# Patient Record
Sex: MALE | Race: WHITE | Employment: OTHER | URBAN - METROPOLITAN AREA
[De-identification: names, ages, dates, MRNs, and addresses within clinical notes are randomized per-mention and may not be internally consistent; named-entity substitution may affect disease eponyms.]

---

## 2017-12-07 ENCOUNTER — LAB ENCOUNTER (OUTPATIENT)
Dept: LAB | Age: 82
End: 2017-12-07
Attending: INTERNAL MEDICINE
Payer: MEDICARE

## 2017-12-07 DIAGNOSIS — R53.81 DEBILITY: ICD-10-CM

## 2017-12-07 DIAGNOSIS — R53.81 OTHER MALAISE: ICD-10-CM

## 2017-12-07 DIAGNOSIS — E55.9 VITAMIN D DEFICIENCY: Primary | ICD-10-CM

## 2017-12-07 DIAGNOSIS — I10 ESSENTIAL HYPERTENSION: ICD-10-CM

## 2017-12-07 DIAGNOSIS — E78.00 PURE HYPERCHOLESTEROLEMIA: ICD-10-CM

## 2017-12-07 DIAGNOSIS — E78.1 PURE HYPERGLYCERIDEMIA: ICD-10-CM

## 2017-12-07 DIAGNOSIS — I97.3: ICD-10-CM

## 2017-12-07 PROCEDURE — 82728 ASSAY OF FERRITIN: CPT

## 2017-12-07 PROCEDURE — 85025 COMPLETE CBC W/AUTO DIFF WBC: CPT

## 2017-12-07 PROCEDURE — 81003 URINALYSIS AUTO W/O SCOPE: CPT

## 2017-12-07 PROCEDURE — 83735 ASSAY OF MAGNESIUM: CPT

## 2017-12-07 PROCEDURE — 84436 ASSAY OF TOTAL THYROXINE: CPT

## 2017-12-07 PROCEDURE — 80053 COMPREHEN METABOLIC PANEL: CPT

## 2017-12-07 PROCEDURE — 36415 COLL VENOUS BLD VENIPUNCTURE: CPT

## 2017-12-07 PROCEDURE — 82306 VITAMIN D 25 HYDROXY: CPT

## 2017-12-07 PROCEDURE — 84480 ASSAY TRIIODOTHYRONINE (T3): CPT

## 2017-12-07 PROCEDURE — 80061 LIPID PANEL: CPT

## 2017-12-07 PROCEDURE — 82746 ASSAY OF FOLIC ACID SERUM: CPT

## 2017-12-07 PROCEDURE — 83540 ASSAY OF IRON: CPT

## 2017-12-07 PROCEDURE — 85652 RBC SED RATE AUTOMATED: CPT

## 2017-12-07 PROCEDURE — 83036 HEMOGLOBIN GLYCOSYLATED A1C: CPT

## 2017-12-07 PROCEDURE — 82607 VITAMIN B-12: CPT

## 2017-12-07 PROCEDURE — 84100 ASSAY OF PHOSPHORUS: CPT

## 2017-12-07 PROCEDURE — 84443 ASSAY THYROID STIM HORMONE: CPT

## 2018-11-14 ENCOUNTER — LAB ENCOUNTER (OUTPATIENT)
Dept: LAB | Facility: HOSPITAL | Age: 83
End: 2018-11-14
Attending: INTERNAL MEDICINE
Payer: MEDICARE

## 2018-11-14 DIAGNOSIS — R53.81 MALAISE: Primary | ICD-10-CM

## 2018-11-14 DIAGNOSIS — R53.82 CHRONIC FATIGUE SYNDROME: ICD-10-CM

## 2018-11-14 DIAGNOSIS — D64.9 ANEMIA: ICD-10-CM

## 2018-11-14 DIAGNOSIS — E78.01 FAMILIAL HYPERCHOLESTEROLEMIA: ICD-10-CM

## 2018-11-14 DIAGNOSIS — N40.0 ENLARGED PROSTATE: ICD-10-CM

## 2018-11-14 DIAGNOSIS — E78.00 HYPERCHOLESTEROLEMIA: ICD-10-CM

## 2018-11-14 DIAGNOSIS — R53.82 CHRONIC FATIGUE: ICD-10-CM

## 2018-11-14 DIAGNOSIS — I10 ESSENTIAL HYPERTENSION, MALIGNANT: ICD-10-CM

## 2018-11-14 DIAGNOSIS — R53.83 FATIGUE: ICD-10-CM

## 2018-11-14 DIAGNOSIS — I97.3: ICD-10-CM

## 2018-11-14 DIAGNOSIS — E55.9 VITAMIN D DEFICIENCY: ICD-10-CM

## 2018-11-14 PROCEDURE — 82746 ASSAY OF FOLIC ACID SERUM: CPT

## 2018-11-14 PROCEDURE — 80061 LIPID PANEL: CPT

## 2018-11-14 PROCEDURE — 84439 ASSAY OF FREE THYROXINE: CPT

## 2018-11-14 PROCEDURE — 84550 ASSAY OF BLOOD/URIC ACID: CPT

## 2018-11-14 PROCEDURE — 84481 FREE ASSAY (FT-3): CPT

## 2018-11-14 PROCEDURE — 82728 ASSAY OF FERRITIN: CPT

## 2018-11-14 PROCEDURE — 82607 VITAMIN B-12: CPT

## 2018-11-14 PROCEDURE — 85025 COMPLETE CBC W/AUTO DIFF WBC: CPT

## 2018-11-14 PROCEDURE — 83540 ASSAY OF IRON: CPT

## 2018-11-14 PROCEDURE — 82306 VITAMIN D 25 HYDROXY: CPT

## 2018-11-14 PROCEDURE — 83735 ASSAY OF MAGNESIUM: CPT

## 2018-11-14 PROCEDURE — 84443 ASSAY THYROID STIM HORMONE: CPT

## 2018-11-14 PROCEDURE — 80053 COMPREHEN METABOLIC PANEL: CPT

## 2018-11-14 PROCEDURE — 36415 COLL VENOUS BLD VENIPUNCTURE: CPT

## 2018-11-14 PROCEDURE — 81003 URINALYSIS AUTO W/O SCOPE: CPT

## 2019-01-01 ENCOUNTER — EXTERNAL RECORD (OUTPATIENT)
Dept: HEALTH INFORMATION MANAGEMENT | Facility: OTHER | Age: 84
End: 2019-01-01

## 2019-01-11 ENCOUNTER — APPOINTMENT (OUTPATIENT)
Dept: LAB | Age: 84
End: 2019-01-11
Attending: DERMATOLOGY
Payer: MEDICARE

## 2019-01-11 DIAGNOSIS — B35.3 TINEA PEDIS OF BOTH FEET: ICD-10-CM

## 2019-01-11 DIAGNOSIS — B35.1 TINEA UNGUIUM: ICD-10-CM

## 2019-01-11 LAB
ALBUMIN SERPL-MCNC: 3.8 G/DL (ref 3.1–4.5)
ALBUMIN/GLOB SERPL: 1.2 {RATIO} (ref 1–2)
ALP LIVER SERPL-CCNC: 105 U/L (ref 45–117)
ALT SERPL-CCNC: 21 U/L (ref 17–63)
ANION GAP SERPL CALC-SCNC: 6 MMOL/L (ref 0–18)
AST SERPL-CCNC: 16 U/L (ref 15–41)
BILIRUB SERPL-MCNC: 0.8 MG/DL (ref 0.1–2)
BUN BLD-MCNC: 15 MG/DL (ref 8–20)
BUN/CREAT SERPL: 13.6 (ref 10–20)
CALCIUM BLD-MCNC: 8.6 MG/DL (ref 8.3–10.3)
CHLORIDE SERPL-SCNC: 109 MMOL/L (ref 101–111)
CO2 SERPL-SCNC: 26 MMOL/L (ref 22–32)
CREAT BLD-MCNC: 1.1 MG/DL (ref 0.7–1.3)
GLOBULIN PLAS-MCNC: 3.1 G/DL (ref 2.8–4.4)
GLUCOSE BLD-MCNC: 110 MG/DL (ref 70–99)
M PROTEIN MFR SERPL ELPH: 6.9 G/DL (ref 6.4–8.2)
OSMOLALITY SERPL CALC.SUM OF ELEC: 293 MOSM/KG (ref 275–295)
POTASSIUM SERPL-SCNC: 4.1 MMOL/L (ref 3.6–5.1)
SODIUM SERPL-SCNC: 141 MMOL/L (ref 136–144)

## 2019-01-11 PROCEDURE — 80053 COMPREHEN METABOLIC PANEL: CPT

## 2019-01-11 PROCEDURE — 85027 COMPLETE CBC AUTOMATED: CPT

## 2019-01-12 LAB
ERYTHROCYTE [DISTWIDTH] IN BLOOD BY AUTOMATED COUNT: 13.5 % (ref 11.5–16)
HCT VFR BLD AUTO: 39.5 % (ref 37–53)
HGB BLD-MCNC: 12.6 G/DL (ref 13–17)
MCH RBC QN AUTO: 32 PG (ref 27–33.2)
MCHC RBC AUTO-ENTMCNC: 31.9 G/DL (ref 31–37)
MCV RBC AUTO: 100.3 FL (ref 80–99)
PLATELET # BLD AUTO: 270 10(3)UL (ref 150–450)
RBC # BLD AUTO: 3.94 X10(6)UL (ref 3.8–5.8)
RED CELL DISTRIBUTION WIDTH-SD: 50.4 FL (ref 35.1–46.3)
WBC # BLD AUTO: 7.3 X10(3) UL (ref 4–13)

## 2019-03-01 ENCOUNTER — APPOINTMENT (OUTPATIENT)
Dept: LAB | Age: 84
End: 2019-03-01
Attending: DERMATOLOGY
Payer: MEDICARE

## 2019-03-01 DIAGNOSIS — B35.1 TINEA UNGUIUM: ICD-10-CM

## 2019-03-01 DIAGNOSIS — B35.3 TINEA PEDIS OF BOTH FEET: ICD-10-CM

## 2019-03-01 LAB
ALBUMIN SERPL-MCNC: 3.9 G/DL (ref 3.4–5)
ALBUMIN/GLOB SERPL: 1.3 {RATIO} (ref 1–2)
ALP LIVER SERPL-CCNC: 102 U/L (ref 45–117)
ALT SERPL-CCNC: 22 U/L (ref 16–61)
ANION GAP SERPL CALC-SCNC: 7 MMOL/L (ref 0–18)
AST SERPL-CCNC: 12 U/L (ref 15–37)
BILIRUB SERPL-MCNC: 0.6 MG/DL (ref 0.1–2)
BUN BLD-MCNC: 13 MG/DL (ref 7–18)
BUN/CREAT SERPL: 13.1 (ref 10–20)
CALCIUM BLD-MCNC: 8.9 MG/DL (ref 8.5–10.1)
CHLORIDE SERPL-SCNC: 109 MMOL/L (ref 98–107)
CO2 SERPL-SCNC: 25 MMOL/L (ref 21–32)
CREAT BLD-MCNC: 0.99 MG/DL (ref 0.7–1.3)
GLOBULIN PLAS-MCNC: 3 G/DL (ref 2.8–4.4)
GLUCOSE BLD-MCNC: 107 MG/DL (ref 70–99)
M PROTEIN MFR SERPL ELPH: 6.9 G/DL (ref 6.4–8.2)
OSMOLALITY SERPL CALC.SUM OF ELEC: 293 MOSM/KG (ref 275–295)
POTASSIUM SERPL-SCNC: 4.3 MMOL/L (ref 3.5–5.1)
SODIUM SERPL-SCNC: 141 MMOL/L (ref 136–145)

## 2019-03-01 PROCEDURE — 80053 COMPREHEN METABOLIC PANEL: CPT

## 2019-03-01 PROCEDURE — 85027 COMPLETE CBC AUTOMATED: CPT

## 2019-03-02 LAB
DEPRECATED RDW RBC AUTO: 53.1 FL (ref 35.1–46.3)
ERYTHROCYTE [DISTWIDTH] IN BLOOD BY AUTOMATED COUNT: 14.7 % (ref 11–15)
HCT VFR BLD AUTO: 41.4 % (ref 39–53)
HGB BLD-MCNC: 13.3 G/DL (ref 13–17.5)
MCH RBC QN AUTO: 31.5 PG (ref 26–34)
MCHC RBC AUTO-ENTMCNC: 32.1 G/DL (ref 31–37)
MCV RBC AUTO: 98.1 FL (ref 80–100)
PLATELET # BLD AUTO: 256 10(3)UL (ref 150–450)
RBC # BLD AUTO: 4.22 X10(6)UL (ref 3.8–5.8)
WBC # BLD AUTO: 6 X10(3) UL (ref 4–11)

## 2019-10-09 ENCOUNTER — HOSPITAL ENCOUNTER (OUTPATIENT)
Dept: MRI IMAGING | Age: 84
Discharge: HOME | End: 2019-10-09
Attending: OPHTHALMOLOGY
Payer: MEDICARE

## 2019-10-09 DIAGNOSIS — H53.433 ARCUATE SCOTOMA OF BOTH EYES: ICD-10-CM

## 2019-10-09 PROCEDURE — 70553 MRI BRAIN STEM W/O & W/DYE: CPT | Performed by: OPHTHALMOLOGY

## 2019-10-09 PROCEDURE — A9575 INJ GADOTERATE MEGLUMI 0.1ML: HCPCS | Performed by: OPHTHALMOLOGY

## 2019-11-08 RX ORDER — BRIMONIDINE TARTRATE 1.5 MG/ML
1 SOLUTION/ DROPS OPHTHALMIC 2 TIMES DAILY
COMMUNITY
Start: 2015-09-24

## 2019-11-08 RX ORDER — DOXAZOSIN MESYLATE 4 MG/1
4 TABLET ORAL DAILY
COMMUNITY

## 2019-11-08 RX ORDER — FUROSEMIDE 20 MG/1
20 TABLET ORAL DAILY
COMMUNITY
Start: 2019-08-08

## 2019-11-08 RX ORDER — LATANOPROST 50 UG/ML
SOLUTION/ DROPS OPHTHALMIC
COMMUNITY
Start: 2019-05-31

## 2019-11-08 RX ORDER — MULTIVITAMIN
1 CAPSULE ORAL DAILY
COMMUNITY
Start: 2014-08-20

## 2019-11-08 RX ORDER — METOPROLOL TARTRATE 50 MG/1
50 TABLET, FILM COATED ORAL 2 TIMES DAILY
COMMUNITY
Start: 2019-08-08 | End: 2019-12-10 | Stop reason: ALTCHOICE

## 2019-11-08 RX ORDER — OMEPRAZOLE 20 MG/1
20 CAPSULE, DELAYED RELEASE ORAL DAILY
COMMUNITY
Start: 2015-12-09 | End: 2019-11-04 | Stop reason: ALTCHOICE

## 2019-11-08 RX ORDER — MULTIVIT WITH MINERALS/LUTEIN
1000 TABLET ORAL DAILY
COMMUNITY
End: 2019-11-04 | Stop reason: ALTCHOICE

## 2019-11-08 RX ORDER — SIMVASTATIN 20 MG
20 TABLET ORAL DAILY
COMMUNITY
End: 2019-11-04 | Stop reason: ALTCHOICE

## 2019-11-08 RX ORDER — DORZOLAMIDE HYDROCHLORIDE AND TIMOLOL MALEATE 20; 5 MG/ML; MG/ML
1 SOLUTION/ DROPS OPHTHALMIC 2 TIMES DAILY
COMMUNITY
Start: 2019-06-12

## 2019-11-11 ENCOUNTER — OFFICE VISIT (OUTPATIENT)
Dept: CARDIOLOGY | Age: 84
End: 2019-11-11

## 2019-11-11 VITALS — BODY MASS INDEX: 30.1 KG/M2 | HEIGHT: 71 IN | WEIGHT: 215 LBS

## 2019-11-11 DIAGNOSIS — R06.02 SHORTNESS OF BREATH: ICD-10-CM

## 2019-11-11 DIAGNOSIS — I48.91 ATRIAL FIBRILLATION, UNSPECIFIED TYPE (CMD): Primary | ICD-10-CM

## 2019-11-11 DIAGNOSIS — E78.00 HYPERCHOLESTEREMIA: ICD-10-CM

## 2019-11-11 DIAGNOSIS — I48.91 ATRIAL FIBRILLATION WITH RVR (CMD): ICD-10-CM

## 2019-11-11 DIAGNOSIS — I42.0 DILATED CARDIOMYOPATHY (CMD): ICD-10-CM

## 2019-11-11 DIAGNOSIS — I51.89 COMBINED SYSTOLIC AND DIASTOLIC CARDIAC DYSFUNCTION: ICD-10-CM

## 2019-11-11 PROCEDURE — 93000 ELECTROCARDIOGRAM COMPLETE: CPT | Performed by: INTERNAL MEDICINE

## 2019-11-11 PROCEDURE — 99205 OFFICE O/P NEW HI 60 MIN: CPT | Performed by: INTERNAL MEDICINE

## 2019-11-11 RX ORDER — FAMOTIDINE 20 MG/1
20 TABLET, FILM COATED ORAL PRN
COMMUNITY

## 2019-11-11 SDOH — HEALTH STABILITY: PHYSICAL HEALTH: ON AVERAGE, HOW MANY DAYS PER WEEK DO YOU ENGAGE IN MODERATE TO STRENUOUS EXERCISE (LIKE A BRISK WALK)?: 2 DAYS

## 2019-11-11 SDOH — SOCIAL STABILITY: SOCIAL NETWORK: ARE YOU MARRIED, WIDOWED, DIVORCED, SEPARATED, NEVER MARRIED, OR LIVING WITH A PARTNER?: MARRIED

## 2019-11-11 SDOH — HEALTH STABILITY: PHYSICAL HEALTH: ON AVERAGE, HOW MANY MINUTES DO YOU ENGAGE IN EXERCISE AT THIS LEVEL?: 90 MIN

## 2019-11-11 ASSESSMENT — PATIENT HEALTH QUESTIONNAIRE - PHQ9
SUM OF ALL RESPONSES TO PHQ9 QUESTIONS 1 AND 2: 0
2. FEELING DOWN, DEPRESSED OR HOPELESS: NOT AT ALL
1. LITTLE INTEREST OR PLEASURE IN DOING THINGS: NOT AT ALL
SUM OF ALL RESPONSES TO PHQ9 QUESTIONS 1 AND 2: 0

## 2019-11-13 ENCOUNTER — TELEPHONE (OUTPATIENT)
Dept: CARDIOLOGY | Age: 84
End: 2019-11-13

## 2019-11-19 ENCOUNTER — LAB ENCOUNTER (OUTPATIENT)
Dept: LAB | Age: 84
End: 2019-11-19
Attending: INTERNAL MEDICINE
Payer: MEDICARE

## 2019-11-19 ENCOUNTER — OFFICE VISIT (OUTPATIENT)
Dept: CARDIOLOGY | Age: 84
End: 2019-11-19

## 2019-11-19 VITALS
SYSTOLIC BLOOD PRESSURE: 112 MMHG | DIASTOLIC BLOOD PRESSURE: 64 MMHG | HEART RATE: 116 BPM | HEIGHT: 71 IN | BODY MASS INDEX: 30.52 KG/M2 | WEIGHT: 218 LBS

## 2019-11-19 DIAGNOSIS — E78.00 PURE HYPERCHOLESTEROLEMIA: ICD-10-CM

## 2019-11-19 DIAGNOSIS — R06.02 SHORTNESS OF BREATH: ICD-10-CM

## 2019-11-19 DIAGNOSIS — I48.91 ATRIAL FIBRILLATION (HCC): Primary | ICD-10-CM

## 2019-11-19 DIAGNOSIS — I48.19 PERSISTENT ATRIAL FIBRILLATION (CMD): Primary | ICD-10-CM

## 2019-11-19 DIAGNOSIS — I51.89 COMBINED SYSTOLIC AND DIASTOLIC CARDIAC DYSFUNCTION: ICD-10-CM

## 2019-11-19 DIAGNOSIS — I48.91 ATRIAL FIBRILLATION WITH RVR (CMD): ICD-10-CM

## 2019-11-19 DIAGNOSIS — I42.0 DILATED CARDIOMYOPATHY (CMD): ICD-10-CM

## 2019-11-19 LAB
ABSOLUTE IMMATURE GRANULOCYTES (OFFPRE24): NORMAL
ALBUMIN SERPL-MCNC: 3.6 G/DL
ALBUMIN/GLOB SERPL: NORMAL {RATIO}
ALP SERPL-CCNC: 92 U/L
ALT SERPL-CCNC: 23 U/L
ANION GAP SERPL CALC-SCNC: NORMAL MMOL/L
AST SERPL-CCNC: 15 U/L
BASO+EOS+MONOS # BLD: NORMAL 10*3/UL
BASO+EOS+MONOS NFR BLD: NORMAL %
BASOPHILS # BLD: NORMAL 10*3/UL
BASOPHILS NFR BLD: NORMAL %
BILIRUB SERPL-MCNC: 0.9 MG/DL
BUN SERPL-MCNC: 16 MG/DL
BUN/CREAT SERPL: NORMAL
CALCIUM SERPL-MCNC: 8.4 MG/DL
CHLORIDE SERPL-SCNC: 111 MMOL/L
CHOLEST SERPL-MCNC: 171 MG/DL
CHOLEST/HDLC SERPL: NORMAL {RATIO}
CO2 SERPL-SCNC: NORMAL MMOL/L
CREAT SERPL-MCNC: 1.07 MG/DL
DIFFERENTIAL METHOD BLD: NORMAL
EOSINOPHIL # BLD: NORMAL 10*3/UL
EOSINOPHIL NFR BLD: NORMAL %
ERYTHROCYTE [DISTWIDTH] IN BLOOD: NORMAL %
GLOBULIN SER-MCNC: 3.5 G/DL
GLUCOSE SERPL-MCNC: 108 MG/DL
HCT VFR BLD CALC: 38.3 %
HDLC SERPL-MCNC: 43 MG/DL
HGB BLD-MCNC: 13.2 G/DL
IMMATURE GRANULOCYTES (OFFPRE25): NORMAL
LDLC SERPL CALC-MCNC: 113 MG/DL
LENGTH OF FAST TIME PATIENT: NORMAL H
LENGTH OF FAST TIME PATIENT: NORMAL H
LYMPHOCYTES # BLD: NORMAL 10*3/UL
LYMPHOCYTES NFR BLD: NORMAL %
MAGNESIUM SERPL-MCNC: 2.2 MG/DL
MCH RBC QN AUTO: NORMAL PG
MCHC RBC AUTO-ENTMCNC: NORMAL G/DL
MCV RBC AUTO: NORMAL FL
MONOCYTES # BLD: NORMAL 10*3/UL
MONOCYTES NFR BLD: NORMAL %
MPV (OFFPRE2): NORMAL
NEUTROPHILS # BLD: NORMAL 10*3/UL
NEUTROPHILS NFR BLD: NORMAL %
NONHDLC SERPL-MCNC: NORMAL MG/DL
NRBC BLD MANUAL-RTO: NORMAL %
PLAT MORPH BLD: NORMAL
PLATELET # BLD: 211 10*3/UL
POTASSIUM SERPL-SCNC: 3.6 MMOL/L
PROT SERPL-MCNC: 7.1 G/DL
RBC # BLD: 4.04 10*6/UL
RBC MORPH BLD: NORMAL
SODIUM SERPL-SCNC: 143 MMOL/L
TRIGL SERPL-MCNC: 76 MG/DL
TSH SERPL-ACNC: 3.17 M[IU]/L
VLDLC SERPL CALC-MCNC: NORMAL MG/DL
WBC # BLD: 6 10*3/UL
WBC MORPH BLD: NORMAL

## 2019-11-19 PROCEDURE — 83735 ASSAY OF MAGNESIUM: CPT

## 2019-11-19 PROCEDURE — 80061 LIPID PANEL: CPT

## 2019-11-19 PROCEDURE — 99214 OFFICE O/P EST MOD 30 MIN: CPT | Performed by: INTERNAL MEDICINE

## 2019-11-19 PROCEDURE — 36415 COLL VENOUS BLD VENIPUNCTURE: CPT

## 2019-11-19 PROCEDURE — 84443 ASSAY THYROID STIM HORMONE: CPT

## 2019-11-19 PROCEDURE — 85025 COMPLETE CBC W/AUTO DIFF WBC: CPT

## 2019-11-19 PROCEDURE — 83880 ASSAY OF NATRIURETIC PEPTIDE: CPT

## 2019-11-19 PROCEDURE — 80053 COMPREHEN METABOLIC PANEL: CPT

## 2019-11-19 RX ORDER — AMIODARONE HYDROCHLORIDE 200 MG/1
200 TABLET ORAL DAILY
Qty: 90 TABLET | Refills: 4 | Status: SHIPPED | OUTPATIENT
Start: 2019-11-19 | End: 2020-12-08

## 2019-11-19 ASSESSMENT — PATIENT HEALTH QUESTIONNAIRE - PHQ9
SUM OF ALL RESPONSES TO PHQ9 QUESTIONS 1 AND 2: 0
SUM OF ALL RESPONSES TO PHQ9 QUESTIONS 1 AND 2: 0
1. LITTLE INTEREST OR PLEASURE IN DOING THINGS: NOT AT ALL
2. FEELING DOWN, DEPRESSED OR HOPELESS: NOT AT ALL

## 2019-11-19 ASSESSMENT — ENCOUNTER SYMPTOMS
HEMATOCHEZIA: 0
COUGH: 0
BRUISES/BLEEDS EASILY: 0
HEMOPTYSIS: 0
SUSPICIOUS LESIONS: 0
ALLERGIC/IMMUNOLOGIC COMMENTS: NO NEW FOOD ALLERGIES
SHORTNESS OF BREATH: 1
CHILLS: 0
WEIGHT GAIN: 0
FEVER: 0
WEIGHT LOSS: 0

## 2019-11-20 ENCOUNTER — TELEPHONE (OUTPATIENT)
Dept: CARDIOLOGY | Age: 84
End: 2019-11-20

## 2019-11-27 ENCOUNTER — TELEPHONE (OUTPATIENT)
Dept: CARDIOLOGY | Age: 84
End: 2019-11-27

## 2019-12-02 ENCOUNTER — HOSPITAL ENCOUNTER (OUTPATIENT)
Dept: CV DIAGNOSTICS | Facility: HOSPITAL | Age: 84
Discharge: HOME | End: 2019-12-02
Attending: INTERNAL MEDICINE
Payer: MEDICARE

## 2019-12-02 DIAGNOSIS — I51.89 COMBINED SYSTOLIC AND DIASTOLIC CARDIAC DYSFUNCTION: ICD-10-CM

## 2019-12-02 DIAGNOSIS — I48.91 ATRIAL FIBRILLATION, UNSPECIFIED TYPE (HCC): ICD-10-CM

## 2019-12-02 DIAGNOSIS — R06.02 SOB (SHORTNESS OF BREATH): ICD-10-CM

## 2019-12-02 DIAGNOSIS — R06.02 SHORTNESS OF BREATH: ICD-10-CM

## 2019-12-02 PROCEDURE — 78452 HT MUSCLE IMAGE SPECT MULT: CPT | Performed by: INTERNAL MEDICINE

## 2019-12-02 PROCEDURE — 93018 CV STRESS TEST I&R ONLY: CPT | Performed by: INTERNAL MEDICINE

## 2019-12-02 PROCEDURE — 93017 CV STRESS TEST TRACING ONLY: CPT | Performed by: INTERNAL MEDICINE

## 2019-12-02 PROCEDURE — 93306 TTE W/DOPPLER COMPLETE: CPT | Performed by: INTERNAL MEDICINE

## 2019-12-03 ENCOUNTER — CLINICAL ABSTRACT (OUTPATIENT)
Dept: CARDIOLOGY | Age: 84
End: 2019-12-03

## 2019-12-10 ENCOUNTER — TELEPHONE (OUTPATIENT)
Dept: CARDIOLOGY | Age: 84
End: 2019-12-10

## 2019-12-10 DIAGNOSIS — I51.89 COMBINED SYSTOLIC AND DIASTOLIC CARDIAC DYSFUNCTION: ICD-10-CM

## 2019-12-10 DIAGNOSIS — I42.0 DILATED CARDIOMYOPATHY (CMD): Primary | ICD-10-CM

## 2019-12-10 RX ORDER — LISINOPRIL 5 MG/1
5 TABLET ORAL DAILY
Qty: 90 TABLET | Refills: 0 | Status: SHIPPED | OUTPATIENT
Start: 2019-12-10 | End: 2020-02-25 | Stop reason: SDUPTHER

## 2019-12-10 RX ORDER — METOPROLOL SUCCINATE 100 MG/1
100 TABLET, EXTENDED RELEASE ORAL AT BEDTIME
Qty: 90 TABLET | Refills: 0 | Status: SHIPPED | OUTPATIENT
Start: 2019-12-10 | End: 2020-09-21

## 2019-12-12 ENCOUNTER — E-ADVICE (OUTPATIENT)
Dept: CARDIOLOGY | Age: 84
End: 2019-12-12

## 2019-12-13 ENCOUNTER — TELEPHONE (OUTPATIENT)
Dept: CARDIOLOGY | Age: 84
End: 2019-12-13

## 2019-12-16 RX ORDER — FUROSEMIDE 20 MG/1
20 TABLET ORAL
COMMUNITY

## 2019-12-16 RX ORDER — LISINOPRIL 5 MG/1
5 TABLET ORAL DAILY
COMMUNITY

## 2019-12-16 RX ORDER — LATANOPROST 50 UG/ML
1 SOLUTION/ DROPS OPHTHALMIC NIGHTLY
COMMUNITY

## 2019-12-16 RX ORDER — METOPROLOL SUCCINATE 100 MG/1
100 TABLET, EXTENDED RELEASE ORAL NIGHTLY
COMMUNITY

## 2019-12-16 RX ORDER — AMIODARONE HYDROCHLORIDE 200 MG/1
200 TABLET ORAL DAILY
COMMUNITY

## 2019-12-16 NOTE — H&P
Progress Notes  - documented in this encounter  Gloria Ramirez MD - 2019 9:00 AM CST  Formatting of this note might be different from the original.  [address] [place] [address]  Electrophysiology Clinic Note    Constance Luu  : 1934  PCP drugs. Allergies:  ALLERGIES:  No Known Allergies    Medications:  Current Outpatient Medications   Medication Sig Dispense Refill   • famotidine (PEPCID) 20 MG tablet Take 20 mg by mouth as needed.    • Multiple Vitamin (VITAMIN - THERAPEUTIC MULTIVITAM DTP  GI: soft, non-tender, non-distended  Ext: warm, well perfused, mild LE edema  Skin: warm, dry    Labs:  No results found for any previous visit.   ]    Data:    ECG (11/11/2019): atrial fibrillation    TTE (8/2019): LVEF 30-35%, moderately dilated LA

## 2019-12-18 ENCOUNTER — HOSPITAL ENCOUNTER (OUTPATIENT)
Dept: INTERVENTIONAL RADIOLOGY/VASCULAR | Facility: HOSPITAL | Age: 84
Discharge: HOME | End: 2019-12-18
Attending: INTERNAL MEDICINE | Admitting: INTERNAL MEDICINE
Payer: MEDICARE

## 2019-12-18 VITALS
RESPIRATION RATE: 12 BRPM | DIASTOLIC BLOOD PRESSURE: 65 MMHG | OXYGEN SATURATION: 98 % | HEART RATE: 50 BPM | HEIGHT: 70 IN | BODY MASS INDEX: 30.78 KG/M2 | SYSTOLIC BLOOD PRESSURE: 122 MMHG | TEMPERATURE: 98 F | WEIGHT: 215 LBS

## 2019-12-18 DIAGNOSIS — I48.91 ATRIAL FIBRILLATION, UNSPECIFIED TYPE (HCC): ICD-10-CM

## 2019-12-18 PROCEDURE — 92960 CARDIOVERSION ELECTRIC EXT: CPT | Performed by: INTERNAL MEDICINE

## 2019-12-18 PROCEDURE — 93010 ELECTROCARDIOGRAM REPORT: CPT | Performed by: INTERNAL MEDICINE

## 2019-12-18 PROCEDURE — 92960 CARDIOVERSION ELECTRIC EXT: CPT

## 2019-12-18 PROCEDURE — 93005 ELECTROCARDIOGRAM TRACING: CPT

## 2019-12-18 PROCEDURE — 5A2204Z RESTORATION OF CARDIAC RHYTHM, SINGLE: ICD-10-PCS | Performed by: INTERNAL MEDICINE

## 2019-12-18 RX ORDER — SODIUM CHLORIDE 9 MG/ML
INJECTION, SOLUTION INTRAVENOUS CONTINUOUS
Status: DISCONTINUED | OUTPATIENT
Start: 2019-12-18 | End: 2019-12-18

## 2019-12-18 RX ADMIN — SODIUM CHLORIDE: 9 INJECTION, SOLUTION INTRAVENOUS at 09:45:00

## 2019-12-18 NOTE — H&P
[facility] Pre Procedure History and Physical      History of Present Illness:  Constance Luu is a 80year old male with PMHx of early persistent atrial fibrillation now on amiodarone and NICM EF 30-35% who presents for DCCV.  No missed 215 lb, SpO2 97 %. Temp (24hrs), Av.2 °F (36.8 °C), Min:98.2 °F (36.8 °C), Max:98.2 °F (36.8 °C)    Wt Readings from Last 3 Encounters:  19 : 215 lb      General: Alert and oriented in no apparent distress. Neuro:  A+O x 3  HEENT: No carotid bru

## 2019-12-18 NOTE — PROCEDURES
Cardioversion Procedure Note:    PROCEDURE:   1) External direct current cardioversion  2) Administration of IV conscious sedation    DATE OF PROCEDURE: 77/26/11    COMPLICATIONS: None     Physician: Chilo Reveles MD    Saulo Calvert is a 80year old male

## 2019-12-18 NOTE — PROGRESS NOTES
Successful cardioversion at bedside with Dr Vito Rubalcava. Pt tolerated procedure well. See bedside sedation record. Pt converted to SB with PACs confirmed by 12L EKG. Dr Vito Rubalcava spoke with family. Neuro intact. Family at bedside.     11:30: Pt tolerated po intake

## 2019-12-23 ENCOUNTER — LAB ENCOUNTER (OUTPATIENT)
Dept: LAB | Age: 84
End: 2019-12-23
Attending: INTERNAL MEDICINE
Payer: MEDICARE

## 2019-12-23 DIAGNOSIS — I42.0 CONGESTIVE CARDIOMYOPATHY (HCC): Primary | ICD-10-CM

## 2019-12-23 PROCEDURE — 80048 BASIC METABOLIC PNL TOTAL CA: CPT

## 2019-12-23 PROCEDURE — 36415 COLL VENOUS BLD VENIPUNCTURE: CPT

## 2020-01-07 ENCOUNTER — OFFICE VISIT (OUTPATIENT)
Dept: CARDIOLOGY | Age: 85
End: 2020-01-07

## 2020-01-07 VITALS
BODY MASS INDEX: 30.78 KG/M2 | HEART RATE: 58 BPM | DIASTOLIC BLOOD PRESSURE: 68 MMHG | WEIGHT: 215 LBS | SYSTOLIC BLOOD PRESSURE: 120 MMHG | HEIGHT: 70 IN

## 2020-01-07 DIAGNOSIS — Z09 HOSPITAL DISCHARGE FOLLOW-UP: ICD-10-CM

## 2020-01-07 DIAGNOSIS — I42.0 DILATED CARDIOMYOPATHY (CMD): ICD-10-CM

## 2020-01-07 DIAGNOSIS — I51.89 COMBINED SYSTOLIC AND DIASTOLIC CARDIAC DYSFUNCTION: ICD-10-CM

## 2020-01-07 DIAGNOSIS — Z86.79 ATRIAL FIBRILLATION, CURRENTLY IN SINUS RHYTHM: Primary | ICD-10-CM

## 2020-01-07 PROCEDURE — 99213 OFFICE O/P EST LOW 20 MIN: CPT | Performed by: NURSE PRACTITIONER

## 2020-01-07 PROCEDURE — 93000 ELECTROCARDIOGRAM COMPLETE: CPT | Performed by: NURSE PRACTITIONER

## 2020-01-07 ASSESSMENT — ENCOUNTER SYMPTOMS: SHORTNESS OF BREATH: 1

## 2020-01-07 ASSESSMENT — PATIENT HEALTH QUESTIONNAIRE - PHQ9
1. LITTLE INTEREST OR PLEASURE IN DOING THINGS: NOT AT ALL
SUM OF ALL RESPONSES TO PHQ9 QUESTIONS 1 AND 2: 0
SUM OF ALL RESPONSES TO PHQ9 QUESTIONS 1 AND 2: 0
2. FEELING DOWN, DEPRESSED OR HOPELESS: NOT AT ALL

## 2020-02-11 ENCOUNTER — HOSPITAL ENCOUNTER (OUTPATIENT)
Dept: CV DIAGNOSTICS | Age: 85
Discharge: HOME | End: 2020-02-11
Attending: INTERNAL MEDICINE
Payer: MEDICARE

## 2020-02-11 DIAGNOSIS — I42.0 DILATED CARDIOMYOPATHY (HCC): ICD-10-CM

## 2020-02-11 DIAGNOSIS — I51.89 COMBINED SYSTOLIC AND DIASTOLIC CARDIAC DYSFUNCTION: ICD-10-CM

## 2020-02-11 PROCEDURE — 93306 TTE W/DOPPLER COMPLETE: CPT | Performed by: INTERNAL MEDICINE

## 2020-02-20 ENCOUNTER — TELEPHONE (OUTPATIENT)
Dept: CARDIOLOGY | Age: 85
End: 2020-02-20

## 2020-02-20 ENCOUNTER — OFFICE VISIT (OUTPATIENT)
Dept: CARDIOLOGY | Age: 85
End: 2020-02-20

## 2020-02-20 VITALS
HEIGHT: 70 IN | SYSTOLIC BLOOD PRESSURE: 140 MMHG | DIASTOLIC BLOOD PRESSURE: 70 MMHG | BODY MASS INDEX: 30.78 KG/M2 | WEIGHT: 215 LBS | HEART RATE: 56 BPM

## 2020-02-20 DIAGNOSIS — Z86.79 ATRIAL FIBRILLATION, CURRENTLY IN SINUS RHYTHM: ICD-10-CM

## 2020-02-20 DIAGNOSIS — Z79.899 ON AMIODARONE THERAPY: Primary | ICD-10-CM

## 2020-02-20 DIAGNOSIS — I42.0 DILATED CARDIOMYOPATHY (CMD): ICD-10-CM

## 2020-02-20 PROCEDURE — 99214 OFFICE O/P EST MOD 30 MIN: CPT | Performed by: INTERNAL MEDICINE

## 2020-02-20 ASSESSMENT — ENCOUNTER SYMPTOMS
WEIGHT LOSS: 0
HEMOPTYSIS: 0
COUGH: 0
SUSPICIOUS LESIONS: 0
WEIGHT GAIN: 0
CHILLS: 0
FEVER: 0
BRUISES/BLEEDS EASILY: 0
ALLERGIC/IMMUNOLOGIC COMMENTS: NO NEW FOOD ALLERGIES
HEMATOCHEZIA: 0

## 2020-02-24 ENCOUNTER — OFFICE VISIT (OUTPATIENT)
Dept: CARDIOLOGY | Age: 85
End: 2020-02-24

## 2020-02-24 VITALS
WEIGHT: 215 LBS | HEIGHT: 70 IN | SYSTOLIC BLOOD PRESSURE: 132 MMHG | BODY MASS INDEX: 30.78 KG/M2 | DIASTOLIC BLOOD PRESSURE: 74 MMHG | HEART RATE: 58 BPM

## 2020-02-24 DIAGNOSIS — I42.0 DILATED CARDIOMYOPATHY (CMD): ICD-10-CM

## 2020-02-24 DIAGNOSIS — Z86.79 ATRIAL FIBRILLATION, CURRENTLY IN SINUS RHYTHM: Primary | ICD-10-CM

## 2020-02-24 DIAGNOSIS — E78.00 HYPERCHOLESTEREMIA: ICD-10-CM

## 2020-02-24 DIAGNOSIS — I77.810 DILATATION OF THORACIC AORTA (CMD): ICD-10-CM

## 2020-02-24 PROCEDURE — 99214 OFFICE O/P EST MOD 30 MIN: CPT | Performed by: INTERNAL MEDICINE

## 2020-02-24 PROCEDURE — 93000 ELECTROCARDIOGRAM COMPLETE: CPT | Performed by: INTERNAL MEDICINE

## 2020-02-24 RX ORDER — METOPROLOL TARTRATE 50 MG/1
50 TABLET, FILM COATED ORAL DAILY
COMMUNITY

## 2020-02-24 SDOH — HEALTH STABILITY: PHYSICAL HEALTH: ON AVERAGE, HOW MANY DAYS PER WEEK DO YOU ENGAGE IN MODERATE TO STRENUOUS EXERCISE (LIKE A BRISK WALK)?: 2 DAYS

## 2020-02-24 SDOH — HEALTH STABILITY: PHYSICAL HEALTH: ON AVERAGE, HOW MANY MINUTES DO YOU ENGAGE IN EXERCISE AT THIS LEVEL?: 90 MIN

## 2020-02-24 ASSESSMENT — PATIENT HEALTH QUESTIONNAIRE - PHQ9
2. FEELING DOWN, DEPRESSED OR HOPELESS: NOT AT ALL
SUM OF ALL RESPONSES TO PHQ9 QUESTIONS 1 AND 2: 0
SUM OF ALL RESPONSES TO PHQ9 QUESTIONS 1 AND 2: 0
1. LITTLE INTEREST OR PLEASURE IN DOING THINGS: NOT AT ALL

## 2020-02-25 DIAGNOSIS — I42.0 DILATED CARDIOMYOPATHY (CMD): ICD-10-CM

## 2020-02-26 RX ORDER — LISINOPRIL 5 MG/1
TABLET ORAL
Qty: 90 TABLET | Refills: 3 | Status: SHIPPED | OUTPATIENT
Start: 2020-02-26 | End: 2020-12-10

## 2020-03-07 ENCOUNTER — HOSPITAL ENCOUNTER (OUTPATIENT)
Dept: CT IMAGING | Facility: HOSPITAL | Age: 85
Discharge: HOME | End: 2020-03-07
Attending: INTERNAL MEDICINE
Payer: MEDICARE

## 2020-03-07 ENCOUNTER — LAB ENCOUNTER (OUTPATIENT)
Dept: LAB | Facility: HOSPITAL | Age: 85
End: 2020-03-07
Attending: INTERNAL MEDICINE
Payer: MEDICARE

## 2020-03-07 DIAGNOSIS — Z79.899 ENCOUNTER FOR LONG-TERM (CURRENT) USE OF OTHER MEDICATIONS: Primary | ICD-10-CM

## 2020-03-07 DIAGNOSIS — R91.8 PULMONARY NODULES: ICD-10-CM

## 2020-03-07 LAB — T4 FREE SERPL-MCNC: 1.1 NG/DL (ref 0.8–1.7)

## 2020-03-07 PROCEDURE — 71250 CT THORAX DX C-: CPT | Performed by: INTERNAL MEDICINE

## 2020-03-07 PROCEDURE — 84439 ASSAY OF FREE THYROXINE: CPT

## 2020-03-07 PROCEDURE — 36415 COLL VENOUS BLD VENIPUNCTURE: CPT

## 2020-05-05 ENCOUNTER — LAB ENCOUNTER (OUTPATIENT)
Dept: LAB | Age: 85
End: 2020-05-05
Attending: INTERNAL MEDICINE
Payer: MEDICARE

## 2020-05-05 DIAGNOSIS — Z79.899 ENCOUNTER FOR LONG-TERM (CURRENT) USE OF OTHER MEDICATIONS: Primary | ICD-10-CM

## 2020-05-05 PROCEDURE — 84450 TRANSFERASE (AST) (SGOT): CPT

## 2020-05-05 PROCEDURE — 84443 ASSAY THYROID STIM HORMONE: CPT

## 2020-05-05 PROCEDURE — 84460 ALANINE AMINO (ALT) (SGPT): CPT

## 2020-05-05 PROCEDURE — 36415 COLL VENOUS BLD VENIPUNCTURE: CPT

## 2020-05-05 PROCEDURE — 84439 ASSAY OF FREE THYROXINE: CPT

## 2020-05-08 ENCOUNTER — TELEPHONE (OUTPATIENT)
Dept: CARDIOLOGY | Age: 85
End: 2020-05-08

## 2020-05-08 ENCOUNTER — E-ADVICE (OUTPATIENT)
Dept: CARDIOLOGY | Age: 85
End: 2020-05-08

## 2020-09-14 ENCOUNTER — LAB ENCOUNTER (OUTPATIENT)
Dept: LAB | Age: 85
End: 2020-09-14
Attending: INTERNAL MEDICINE
Payer: MEDICARE

## 2020-09-14 ENCOUNTER — HOSPITAL ENCOUNTER (OUTPATIENT)
Dept: GENERAL RADIOLOGY | Age: 85
Discharge: HOME | End: 2020-09-14
Attending: INTERNAL MEDICINE
Payer: MEDICARE

## 2020-09-14 DIAGNOSIS — I48.91 ATRIAL FIBRILLATION, UNSPECIFIED TYPE (HCC): ICD-10-CM

## 2020-09-14 DIAGNOSIS — I42.0 CONGESTIVE CARDIOMYOPATHY (HCC): ICD-10-CM

## 2020-09-14 DIAGNOSIS — Z86.79 PERSONAL HISTORY OF UNSPECIFIED CIRCULATORY DISEASE: Primary | ICD-10-CM

## 2020-09-14 DIAGNOSIS — E78.00 PURE HYPERCHOLESTEROLEMIA: ICD-10-CM

## 2020-09-14 LAB
ALBUMIN SERPL-MCNC: 3.5 G/DL (ref 3.4–5)
ALBUMIN/GLOB SERPL: 1 {RATIO} (ref 1–2)
ALP LIVER SERPL-CCNC: 84 U/L (ref 45–117)
ALT SERPL-CCNC: 20 U/L (ref 16–61)
ANION GAP SERPL CALC-SCNC: 3 MMOL/L (ref 0–18)
AST SERPL-CCNC: 12 U/L (ref 15–37)
BILIRUB SERPL-MCNC: 0.5 MG/DL (ref 0.1–2)
BUN BLD-MCNC: 15 MG/DL (ref 7–18)
BUN/CREAT SERPL: 12.2 (ref 10–20)
CALCIUM BLD-MCNC: 9.3 MG/DL (ref 8.5–10.1)
CHLORIDE SERPL-SCNC: 111 MMOL/L (ref 98–112)
CHOLEST SMN-MCNC: 193 MG/DL (ref ?–200)
CO2 SERPL-SCNC: 30 MMOL/L (ref 21–32)
CREAT BLD-MCNC: 1.23 MG/DL (ref 0.7–1.3)
GLOBULIN PLAS-MCNC: 3.4 G/DL (ref 2.8–4.4)
GLUCOSE BLD-MCNC: 115 MG/DL (ref 70–99)
HDLC SERPL-MCNC: 51 MG/DL (ref 40–59)
LDLC SERPL CALC-MCNC: 130 MG/DL (ref ?–100)
M PROTEIN MFR SERPL ELPH: 6.9 G/DL (ref 6.4–8.2)
NONHDLC SERPL-MCNC: 142 MG/DL (ref ?–130)
OSMOLALITY SERPL CALC.SUM OF ELEC: 300 MOSM/KG (ref 275–295)
PATIENT FASTING Y/N/NP: YES
PATIENT FASTING Y/N/NP: YES
POTASSIUM SERPL-SCNC: 4.3 MMOL/L (ref 3.5–5.1)
SODIUM SERPL-SCNC: 144 MMOL/L (ref 136–145)
TRIGL SERPL-MCNC: 61 MG/DL (ref 30–149)
VLDLC SERPL CALC-MCNC: 12 MG/DL (ref 0–30)

## 2020-09-14 PROCEDURE — 80053 COMPREHEN METABOLIC PANEL: CPT

## 2020-09-14 PROCEDURE — 80061 LIPID PANEL: CPT

## 2020-09-14 PROCEDURE — 36415 COLL VENOUS BLD VENIPUNCTURE: CPT

## 2020-09-14 PROCEDURE — 71046 X-RAY EXAM CHEST 2 VIEWS: CPT | Performed by: INTERNAL MEDICINE

## 2020-09-15 ENCOUNTER — DOCUMENTATION (OUTPATIENT)
Dept: CARDIOLOGY | Age: 85
End: 2020-09-15

## 2020-09-21 ENCOUNTER — OFFICE VISIT (OUTPATIENT)
Dept: CARDIOLOGY | Age: 85
End: 2020-09-21

## 2020-09-21 VITALS
HEART RATE: 56 BPM | SYSTOLIC BLOOD PRESSURE: 136 MMHG | HEIGHT: 70 IN | WEIGHT: 212 LBS | BODY MASS INDEX: 30.35 KG/M2 | DIASTOLIC BLOOD PRESSURE: 74 MMHG

## 2020-09-21 DIAGNOSIS — R06.02 SHORTNESS OF BREATH: Primary | ICD-10-CM

## 2020-09-21 DIAGNOSIS — I51.89 COMBINED SYSTOLIC AND DIASTOLIC CARDIAC DYSFUNCTION: ICD-10-CM

## 2020-09-21 DIAGNOSIS — E78.00 HYPERCHOLESTEREMIA: ICD-10-CM

## 2020-09-21 DIAGNOSIS — Z86.79 ATRIAL FIBRILLATION, CURRENTLY IN SINUS RHYTHM: ICD-10-CM

## 2020-09-21 DIAGNOSIS — I77.810 DILATATION OF THORACIC AORTA (CMD): ICD-10-CM

## 2020-09-21 DIAGNOSIS — I42.0 DILATED CARDIOMYOPATHY (CMD): ICD-10-CM

## 2020-09-21 PROCEDURE — 99214 OFFICE O/P EST MOD 30 MIN: CPT | Performed by: INTERNAL MEDICINE

## 2020-09-21 SDOH — HEALTH STABILITY: PHYSICAL HEALTH: ON AVERAGE, HOW MANY MINUTES DO YOU ENGAGE IN EXERCISE AT THIS LEVEL?: 90 MIN

## 2020-09-21 SDOH — HEALTH STABILITY: PHYSICAL HEALTH: ON AVERAGE, HOW MANY DAYS PER WEEK DO YOU ENGAGE IN MODERATE TO STRENUOUS EXERCISE (LIKE A BRISK WALK)?: 2 DAYS

## 2020-09-21 ASSESSMENT — PATIENT HEALTH QUESTIONNAIRE - PHQ9
SUM OF ALL RESPONSES TO PHQ9 QUESTIONS 1 AND 2: 0
1. LITTLE INTEREST OR PLEASURE IN DOING THINGS: NOT AT ALL
SUM OF ALL RESPONSES TO PHQ9 QUESTIONS 1 AND 2: 0
CLINICAL INTERPRETATION OF PHQ2 SCORE: NO FURTHER SCREENING NEEDED
2. FEELING DOWN, DEPRESSED OR HOPELESS: NOT AT ALL
CLINICAL INTERPRETATION OF PHQ9 SCORE: NO FURTHER SCREENING NEEDED

## 2020-10-04 DIAGNOSIS — I42.0 DILATED CARDIOMYOPATHY (CMD): ICD-10-CM

## 2020-10-06 RX ORDER — METOPROLOL SUCCINATE 100 MG/1
TABLET, EXTENDED RELEASE ORAL
Qty: 90 TABLET | Refills: 3 | OUTPATIENT
Start: 2020-10-06

## 2020-10-12 ENCOUNTER — TELEPHONE (OUTPATIENT)
Dept: CARDIOLOGY | Age: 85
End: 2020-10-12

## 2020-10-12 DIAGNOSIS — Z86.79 ATRIAL FIBRILLATION, CURRENTLY IN SINUS RHYTHM: Primary | ICD-10-CM

## 2020-10-26 ENCOUNTER — ORDER TRANSCRIPTION (OUTPATIENT)
Dept: ADMINISTRATIVE | Facility: HOSPITAL | Age: 85
End: 2020-10-26

## 2020-10-27 ENCOUNTER — HOSPITAL ENCOUNTER (OUTPATIENT)
Dept: CT IMAGING | Facility: HOSPITAL | Age: 85
Discharge: HOME | End: 2020-10-27
Attending: INTERNAL MEDICINE
Payer: MEDICARE

## 2020-10-27 DIAGNOSIS — R91.8 PULMONARY NODULES: ICD-10-CM

## 2020-10-27 PROCEDURE — 71250 CT THORAX DX C-: CPT | Performed by: INTERNAL MEDICINE

## 2020-11-12 ENCOUNTER — LAB ENCOUNTER (OUTPATIENT)
Dept: LAB | Age: 85
End: 2020-11-12
Attending: NURSE PRACTITIONER
Payer: MEDICARE

## 2020-11-12 DIAGNOSIS — I51.9 MYXEDEMA HEART DISEASE: Primary | ICD-10-CM

## 2020-11-12 DIAGNOSIS — E03.9 MYXEDEMA HEART DISEASE: Primary | ICD-10-CM

## 2020-11-12 PROCEDURE — 84481 FREE ASSAY (FT-3): CPT

## 2020-11-12 PROCEDURE — 84443 ASSAY THYROID STIM HORMONE: CPT

## 2020-11-12 PROCEDURE — 84439 ASSAY OF FREE THYROXINE: CPT

## 2020-11-12 PROCEDURE — 36415 COLL VENOUS BLD VENIPUNCTURE: CPT

## 2020-12-08 DIAGNOSIS — I42.0 DILATED CARDIOMYOPATHY (CMD): ICD-10-CM

## 2020-12-08 RX ORDER — AMIODARONE HYDROCHLORIDE 200 MG/1
TABLET ORAL
Qty: 90 TABLET | Refills: 0 | Status: SHIPPED | OUTPATIENT
Start: 2020-12-08 | End: 2021-02-03 | Stop reason: SDUPTHER

## 2020-12-10 RX ORDER — LISINOPRIL 5 MG/1
TABLET ORAL
Qty: 90 TABLET | Refills: 3 | Status: SHIPPED | OUTPATIENT
Start: 2020-12-10

## 2021-01-26 ENCOUNTER — LAB ENCOUNTER (OUTPATIENT)
Dept: LAB | Age: 86
End: 2021-01-26
Attending: INTERNAL MEDICINE
Payer: MEDICARE

## 2021-01-26 DIAGNOSIS — Z86.79 PERSONAL HISTORY OF UNSPECIFIED CIRCULATORY DISEASE: Primary | ICD-10-CM

## 2021-01-26 LAB
DEPRECATED RDW RBC AUTO: 50.6 FL (ref 35.1–46.3)
ERYTHROCYTE [DISTWIDTH] IN BLOOD BY AUTOMATED COUNT: 13.9 % (ref 11–15)
HCT VFR BLD AUTO: 39.2 %
HCT VFR BLD CALC: 39.2 %
HGB BLD-MCNC: 13.1 G/DL
HGB BLD-MCNC: 13.1 G/DL
MCH RBC QN AUTO: 32.9 PG (ref 26–34)
MCHC RBC AUTO-ENTMCNC: 33.4 G/DL (ref 31–37)
MCV RBC AUTO: 98.5 FL
PLATELET # BLD AUTO: 238 10(3)UL (ref 150–450)
PLATELET # BLD: 238 K/MCL
RBC # BLD AUTO: 3.98 X10(6)UL
RBC # BLD: 3.98 10*6/UL
WBC # BLD AUTO: 7.4 X10(3) UL (ref 4–11)
WBC # BLD: 7.4 K/MCL

## 2021-01-26 PROCEDURE — 85027 COMPLETE CBC AUTOMATED: CPT

## 2021-01-26 PROCEDURE — 36415 COLL VENOUS BLD VENIPUNCTURE: CPT

## 2021-01-27 ENCOUNTER — TELEPHONE (OUTPATIENT)
Dept: CARDIOLOGY | Age: 86
End: 2021-01-27

## 2021-01-27 ENCOUNTER — CLINICAL ABSTRACT (OUTPATIENT)
Dept: CARDIOLOGY | Age: 86
End: 2021-01-27

## 2021-01-27 ENCOUNTER — HOSPITAL ENCOUNTER (OUTPATIENT)
Dept: CV DIAGNOSTICS | Facility: HOSPITAL | Age: 86
Discharge: HOME | End: 2021-01-27
Attending: INTERNAL MEDICINE
Payer: MEDICARE

## 2021-01-27 DIAGNOSIS — Z86.79 ATRIAL FIBRILLATION, CURRENTLY IN SINUS RHYTHM: ICD-10-CM

## 2021-01-27 DIAGNOSIS — I42.0 DILATED CARDIOMYOPATHY (HCC): ICD-10-CM

## 2021-01-27 DIAGNOSIS — I77.810 DILATATION OF THORACIC AORTA (HCC): ICD-10-CM

## 2021-01-27 PROCEDURE — 93306 TTE W/DOPPLER COMPLETE: CPT | Performed by: INTERNAL MEDICINE

## 2021-01-29 ENCOUNTER — E-ADVICE (OUTPATIENT)
Dept: CARDIOLOGY | Age: 86
End: 2021-01-29

## 2021-02-03 ENCOUNTER — OFFICE VISIT (OUTPATIENT)
Dept: CARDIOLOGY | Age: 86
End: 2021-02-03

## 2021-02-03 VITALS
DIASTOLIC BLOOD PRESSURE: 70 MMHG | SYSTOLIC BLOOD PRESSURE: 128 MMHG | BODY MASS INDEX: 32.93 KG/M2 | HEIGHT: 70 IN | WEIGHT: 230 LBS | HEART RATE: 60 BPM

## 2021-02-03 DIAGNOSIS — I51.89 COMBINED SYSTOLIC AND DIASTOLIC CARDIAC DYSFUNCTION: ICD-10-CM

## 2021-02-03 DIAGNOSIS — Z51.81 ENCOUNTER FOR MONITORING AMIODARONE THERAPY: ICD-10-CM

## 2021-02-03 DIAGNOSIS — Z79.899 ENCOUNTER FOR MONITORING AMIODARONE THERAPY: ICD-10-CM

## 2021-02-03 DIAGNOSIS — Z86.79 ATRIAL FIBRILLATION, CURRENTLY IN SINUS RHYTHM: Primary | ICD-10-CM

## 2021-02-03 DIAGNOSIS — Z79.899 ENCOUNTER FOR LONG-TERM (CURRENT) DRUG USE: ICD-10-CM

## 2021-02-03 PROCEDURE — 99214 OFFICE O/P EST MOD 30 MIN: CPT | Performed by: INTERNAL MEDICINE

## 2021-02-03 PROCEDURE — 93000 ELECTROCARDIOGRAM COMPLETE: CPT | Performed by: INTERNAL MEDICINE

## 2021-02-03 RX ORDER — AMIODARONE HYDROCHLORIDE 200 MG/1
200 TABLET ORAL DAILY
Qty: 90 TABLET | Refills: 3 | Status: SHIPPED | OUTPATIENT
Start: 2021-02-03

## 2021-02-03 ASSESSMENT — PATIENT HEALTH QUESTIONNAIRE - PHQ9
SUM OF ALL RESPONSES TO PHQ9 QUESTIONS 1 AND 2: 0
CLINICAL INTERPRETATION OF PHQ9 SCORE: NO FURTHER SCREENING NEEDED
1. LITTLE INTEREST OR PLEASURE IN DOING THINGS: NOT AT ALL
2. FEELING DOWN, DEPRESSED OR HOPELESS: NOT AT ALL
CLINICAL INTERPRETATION OF PHQ2 SCORE: NO FURTHER SCREENING NEEDED
SUM OF ALL RESPONSES TO PHQ9 QUESTIONS 1 AND 2: 0

## 2021-02-25 ENCOUNTER — APPOINTMENT (OUTPATIENT)
Dept: CARDIOLOGY | Age: 86
End: 2021-02-25

## 2021-03-22 ENCOUNTER — OFFICE VISIT (OUTPATIENT)
Dept: CARDIOLOGY | Age: 86
End: 2021-03-22

## 2021-03-22 VITALS
WEIGHT: 227 LBS | BODY MASS INDEX: 32.57 KG/M2 | SYSTOLIC BLOOD PRESSURE: 120 MMHG | DIASTOLIC BLOOD PRESSURE: 78 MMHG | HEART RATE: 57 BPM

## 2021-03-22 DIAGNOSIS — Z79.899 ENCOUNTER FOR LONG-TERM (CURRENT) DRUG USE: Primary | ICD-10-CM

## 2021-03-22 DIAGNOSIS — R06.02 SHORTNESS OF BREATH: ICD-10-CM

## 2021-03-22 DIAGNOSIS — Z86.79 ATRIAL FIBRILLATION, CURRENTLY IN SINUS RHYTHM: ICD-10-CM

## 2021-03-22 DIAGNOSIS — I77.810 DILATATION OF THORACIC AORTA (CMD): ICD-10-CM

## 2021-03-22 DIAGNOSIS — I42.0 DILATED CARDIOMYOPATHY (CMD): ICD-10-CM

## 2021-03-22 DIAGNOSIS — I51.89 COMBINED SYSTOLIC AND DIASTOLIC CARDIAC DYSFUNCTION: ICD-10-CM

## 2021-03-22 DIAGNOSIS — E78.00 HYPERCHOLESTEREMIA: ICD-10-CM

## 2021-03-22 DIAGNOSIS — I65.23 BILATERAL CAROTID ARTERY STENOSIS: ICD-10-CM

## 2021-03-22 PROCEDURE — 99214 OFFICE O/P EST MOD 30 MIN: CPT | Performed by: INTERNAL MEDICINE

## 2021-03-22 ASSESSMENT — PATIENT HEALTH QUESTIONNAIRE - PHQ9
SUM OF ALL RESPONSES TO PHQ9 QUESTIONS 1 AND 2: 0
2. FEELING DOWN, DEPRESSED OR HOPELESS: NOT AT ALL
CLINICAL INTERPRETATION OF PHQ9 SCORE: NO FURTHER SCREENING NEEDED
1. LITTLE INTEREST OR PLEASURE IN DOING THINGS: NOT AT ALL
SUM OF ALL RESPONSES TO PHQ9 QUESTIONS 1 AND 2: 0
CLINICAL INTERPRETATION OF PHQ2 SCORE: NO FURTHER SCREENING NEEDED

## 2021-03-23 ENCOUNTER — LAB ENCOUNTER (OUTPATIENT)
Dept: LAB | Age: 86
End: 2021-03-23
Attending: INTERNAL MEDICINE
Payer: MEDICARE

## 2021-03-23 DIAGNOSIS — R06.02 SHORTNESS OF BREATH: ICD-10-CM

## 2021-03-23 DIAGNOSIS — I51.89 HYPERKINETIC HEART DISEASE: ICD-10-CM

## 2021-03-23 DIAGNOSIS — I42.0 DILATED CARDIOMYOPATHY (HCC): ICD-10-CM

## 2021-03-23 DIAGNOSIS — I77.810 DILATATION OF THORACIC AORTA (HCC): ICD-10-CM

## 2021-03-23 DIAGNOSIS — Z79.899 ENCOUNTER FOR LONG-TERM (CURRENT) DRUG USE: Primary | ICD-10-CM

## 2021-03-23 DIAGNOSIS — E78.00 PURE HYPERCHOLESTEROLEMIA: ICD-10-CM

## 2021-03-23 DIAGNOSIS — Z86.79 PERSONAL HISTORY OF UNSPECIFIED CIRCULATORY DISEASE: ICD-10-CM

## 2021-03-23 DIAGNOSIS — Z86.79 S/P ABLATION OF ATRIAL FIBRILLATION: ICD-10-CM

## 2021-03-23 DIAGNOSIS — Z98.890 S/P ABLATION OF ATRIAL FIBRILLATION: ICD-10-CM

## 2021-03-23 LAB
ALBUMIN SERPL-MCNC: 3.4 G/DL
ALBUMIN SERPL-MCNC: 3.4 G/DL (ref 3.4–5)
ALBUMIN/GLOB SERPL: 1.1 {RATIO} (ref 1–2)
ALP LIVER SERPL-CCNC: 79 U/L
ALP SERPL-CCNC: 79 U/L
ALT SERPL-CCNC: 19 U/L
ALT SERPL-CCNC: 19 UNITS/L
ANION GAP SERPL CALC-SCNC: 4 MMOL/L (ref 0–18)
AST SERPL-CCNC: 13 U/L (ref 15–37)
AST SERPL-CCNC: 13 UNITS/L
BASOPHILS # BLD AUTO: 0.03 X10(3) UL (ref 0–0.2)
BASOPHILS NFR BLD AUTO: 0.4 %
BILIRUB SERPL-MCNC: 0.8 MG/DL
BILIRUB SERPL-MCNC: 0.8 MG/DL (ref 0.1–2)
BUN BLD-MCNC: 17 MG/DL (ref 7–18)
BUN SERPL-MCNC: 17 MG/DL
BUN/CREAT SERPL: 17 (ref 10–20)
CALCIUM BLD-MCNC: 8.8 MG/DL (ref 8.5–10.1)
CALCIUM SERPL-MCNC: 8.8 MG/DL
CHLORIDE SERPL-SCNC: 110 MMOL/L
CHLORIDE SERPL-SCNC: 110 MMOL/L (ref 98–112)
CHOLEST SERPL-MCNC: 204 MG/DL
CHOLEST SMN-MCNC: 204 MG/DL (ref ?–200)
CO2 SERPL-SCNC: 27 MMOL/L (ref 21–32)
CREAT BLD-MCNC: 1 MG/DL
CREAT SERPL-MCNC: 1 MG/DL
DEPRECATED RDW RBC AUTO: 50.2 FL (ref 35.1–46.3)
EOSINOPHIL # BLD AUTO: 0.4 X10(3) UL (ref 0–0.7)
EOSINOPHIL NFR BLD AUTO: 5.7 %
ERYTHROCYTE [DISTWIDTH] IN BLOOD BY AUTOMATED COUNT: 13.7 % (ref 11–15)
GLOBULIN PLAS-MCNC: 3.2 G/DL (ref 2.8–4.4)
GLOBULIN SER-MCNC: 3.2 G/DL
GLUCOSE BLD-MCNC: 107 MG/DL (ref 70–99)
GLUCOSE SERPL-MCNC: 107 MG/DL
HCT VFR BLD AUTO: 36.8 %
HCT VFR BLD CALC: 36.8 %
HDLC SERPL-MCNC: 51 MG/DL
HDLC SERPL-MCNC: 51 MG/DL (ref 40–59)
HGB BLD-MCNC: 12.2 G/DL
HGB BLD-MCNC: 12.2 G/DL
IMM GRANULOCYTES # BLD AUTO: 0.02 X10(3) UL (ref 0–1)
IMM GRANULOCYTES NFR BLD: 0.3 %
LDLC SERPL CALC-MCNC: 138 MG/DL
LDLC SERPL CALC-MCNC: 138 MG/DL (ref ?–100)
LYMPHOCYTES # BLD AUTO: 1.43 X10(3) UL (ref 1–4)
LYMPHOCYTES NFR BLD AUTO: 20.5 %
M PROTEIN MFR SERPL ELPH: 6.6 G/DL (ref 6.4–8.2)
MCH RBC QN AUTO: 33.1 PG (ref 26–34)
MCHC RBC AUTO-ENTMCNC: 33.2 G/DL (ref 31–37)
MCV RBC AUTO: 99.7 FL
MONOCYTES # BLD AUTO: 0.65 X10(3) UL (ref 0.1–1)
MONOCYTES NFR BLD AUTO: 9.3 %
NEUTROPHILS # BLD AUTO: 4.45 X10 (3) UL (ref 1.5–7.7)
NEUTROPHILS # BLD AUTO: 4.45 X10(3) UL (ref 1.5–7.7)
NEUTROPHILS NFR BLD AUTO: 63.8 %
NONHDLC SERPL-MCNC: 153 MG/DL
NONHDLC SERPL-MCNC: 153 MG/DL (ref ?–130)
NT-PROBNP SERPL-MCNC: 522 PG/ML
NT-PROBNP SERPL-MCNC: 522 PG/ML (ref ?–450)
OSMOLALITY SERPL CALC.SUM OF ELEC: 294 MOSM/KG (ref 275–295)
PLATELET # BLD AUTO: 221 10(3)UL (ref 150–450)
PLATELET # BLD: 221 K/MCL
POTASSIUM SERPL-SCNC: 4.3 MMOL/L
POTASSIUM SERPL-SCNC: 4.3 MMOL/L (ref 3.5–5.1)
PROT SERPL-MCNC: 6.6 G/DL
RBC # BLD AUTO: 3.69 X10(6)UL
RBC # BLD: 3.69 10*6/UL
SODIUM SERPL-SCNC: 141 MMOL/L
SODIUM SERPL-SCNC: 141 MMOL/L (ref 136–145)
TRIGL SERPL-MCNC: 74 MG/DL
TRIGL SERPL-MCNC: 74 MG/DL (ref 30–149)
TSH: 3.87 MCUNITS/ML
TSI SER-ACNC: 3.87 MIU/ML (ref 0.36–3.74)
VLDLC SERPL CALC-MCNC: 15 MG/DL (ref 0–30)
WBC # BLD AUTO: 7 X10(3) UL (ref 4–11)
WBC # BLD: 7 K/MCL

## 2021-03-23 PROCEDURE — 36415 COLL VENOUS BLD VENIPUNCTURE: CPT

## 2021-03-23 PROCEDURE — 84443 ASSAY THYROID STIM HORMONE: CPT

## 2021-03-23 PROCEDURE — 80061 LIPID PANEL: CPT

## 2021-03-23 PROCEDURE — 85025 COMPLETE CBC W/AUTO DIFF WBC: CPT

## 2021-03-23 PROCEDURE — 83880 ASSAY OF NATRIURETIC PEPTIDE: CPT

## 2021-03-23 PROCEDURE — 80053 COMPREHEN METABOLIC PANEL: CPT

## 2021-03-24 ENCOUNTER — TELEPHONE (OUTPATIENT)
Dept: CARDIOLOGY | Age: 86
End: 2021-03-24

## 2021-03-24 ENCOUNTER — CLINICAL ABSTRACT (OUTPATIENT)
Dept: CARDIOLOGY | Age: 86
End: 2021-03-24

## 2021-03-25 ENCOUNTER — HOSPITAL ENCOUNTER (OUTPATIENT)
Dept: CARDIOLOGY CLINIC | Facility: HOSPITAL | Age: 86
Discharge: HOME | End: 2021-03-25
Attending: INTERNAL MEDICINE
Payer: MEDICARE

## 2021-03-25 DIAGNOSIS — R06.02 SHORTNESS OF BREATH: ICD-10-CM

## 2021-03-25 DIAGNOSIS — I65.23 BILATERAL CAROTID ARTERY STENOSIS: ICD-10-CM

## 2021-03-25 DIAGNOSIS — Z86.79 ATRIAL FIBRILLATION, CURRENTLY IN SINUS RHYTHM: ICD-10-CM

## 2021-03-25 DIAGNOSIS — I42.0 DILATED CARDIOMYOPATHY (HCC): ICD-10-CM

## 2021-03-25 DIAGNOSIS — Z79.899 ENCOUNTER FOR LONG-TERM (CURRENT) DRUG USE: ICD-10-CM

## 2021-03-25 DIAGNOSIS — E78.00 HYPERCHOLESTEROLEMIA: ICD-10-CM

## 2021-03-25 DIAGNOSIS — I51.89 COMBINED SYSTOLIC AND DIASTOLIC CARDIAC DYSFUNCTION: ICD-10-CM

## 2021-03-25 DIAGNOSIS — I77.810 DILATATION OF THORACIC AORTA (HCC): ICD-10-CM

## 2021-03-25 PROCEDURE — 93880 EXTRACRANIAL BILAT STUDY: CPT | Performed by: INTERNAL MEDICINE

## 2021-03-29 ENCOUNTER — TELEPHONE (OUTPATIENT)
Dept: CARDIOLOGY | Age: 86
End: 2021-03-29

## 2021-09-17 ENCOUNTER — LAB ENCOUNTER (OUTPATIENT)
Dept: LAB | Age: 86
End: 2021-09-17
Attending: INTERNAL MEDICINE
Payer: MEDICARE

## 2021-09-17 DIAGNOSIS — I51.89 HYPERKINETIC HEART DISEASE: ICD-10-CM

## 2021-09-17 DIAGNOSIS — I77.810 DILATATION OF THORACIC AORTA (HCC): ICD-10-CM

## 2021-09-17 DIAGNOSIS — Z98.890 S/P ABLATION OF ATRIAL FIBRILLATION: ICD-10-CM

## 2021-09-17 DIAGNOSIS — Z86.79 S/P ABLATION OF ATRIAL FIBRILLATION: ICD-10-CM

## 2021-09-17 DIAGNOSIS — Z79.899 ENCOUNTER FOR LONG-TERM (CURRENT) DRUG USE: ICD-10-CM

## 2021-09-17 DIAGNOSIS — E78.00 PURE HYPERCHOLESTEROLEMIA: ICD-10-CM

## 2021-09-17 DIAGNOSIS — R06.02 SHORTNESS OF BREATH: ICD-10-CM

## 2021-09-17 DIAGNOSIS — I42.0 DILATED CARDIOMYOPATHY (HCC): ICD-10-CM

## 2021-09-17 DIAGNOSIS — Z86.79 PERSONAL HISTORY OF UNSPECIFIED CIRCULATORY DISEASE: ICD-10-CM

## 2021-09-17 LAB
ALBUMIN SERPL-MCNC: 3.1 G/DL (ref 3.4–5)
ALBUMIN/GLOB SERPL: 0.9 {RATIO} (ref 1–2)
ALP LIVER SERPL-CCNC: 79 U/L
ALT SERPL-CCNC: 16 U/L
ANION GAP SERPL CALC-SCNC: 6 MMOL/L (ref 0–18)
AST SERPL-CCNC: 14 U/L (ref 15–37)
BASOPHILS # BLD AUTO: 0.03 X10(3) UL (ref 0–0.2)
BASOPHILS NFR BLD AUTO: 0.4 %
BILIRUB SERPL-MCNC: 0.5 MG/DL (ref 0.1–2)
BUN BLD-MCNC: 14 MG/DL (ref 7–18)
CALCIUM BLD-MCNC: 8.7 MG/DL (ref 8.5–10.1)
CHLORIDE SERPL-SCNC: 111 MMOL/L (ref 98–112)
CO2 SERPL-SCNC: 25 MMOL/L (ref 21–32)
CREAT BLD-MCNC: 1 MG/DL
EOSINOPHIL # BLD AUTO: 0.25 X10(3) UL (ref 0–0.7)
EOSINOPHIL NFR BLD AUTO: 3.1 %
ERYTHROCYTE [DISTWIDTH] IN BLOOD BY AUTOMATED COUNT: 14.7 %
GLOBULIN PLAS-MCNC: 3.6 G/DL (ref 2.8–4.4)
GLUCOSE BLD-MCNC: 101 MG/DL (ref 70–99)
HCT VFR BLD AUTO: 37.7 %
HGB BLD-MCNC: 12.4 G/DL
IMM GRANULOCYTES # BLD AUTO: 0.01 X10(3) UL (ref 0–1)
IMM GRANULOCYTES NFR BLD: 0.1 %
LYMPHOCYTES # BLD AUTO: 1.45 X10(3) UL (ref 1–4)
LYMPHOCYTES NFR BLD AUTO: 18.3 %
MCH RBC QN AUTO: 33 PG (ref 26–34)
MCHC RBC AUTO-ENTMCNC: 32.9 G/DL (ref 31–37)
MCV RBC AUTO: 100.3 FL
MONOCYTES # BLD AUTO: 0.71 X10(3) UL (ref 0.1–1)
MONOCYTES NFR BLD AUTO: 8.9 %
NEUTROPHILS # BLD AUTO: 5.49 X10 (3) UL (ref 1.5–7.7)
NEUTROPHILS # BLD AUTO: 5.49 X10(3) UL (ref 1.5–7.7)
NEUTROPHILS NFR BLD AUTO: 69.2 %
OSMOLALITY SERPL CALC.SUM OF ELEC: 295 MOSM/KG (ref 275–295)
PATIENT FASTING Y/N/NP: NO
PLATELET # BLD AUTO: 275 10(3)UL (ref 150–450)
POTASSIUM SERPL-SCNC: 4.2 MMOL/L (ref 3.5–5.1)
PROT SERPL-MCNC: 6.7 G/DL (ref 6.4–8.2)
RBC # BLD AUTO: 3.76 X10(6)UL
SODIUM SERPL-SCNC: 142 MMOL/L (ref 136–145)
T4 FREE SERPL-MCNC: 1.2 NG/DL (ref 0.8–1.7)
TSI SER-ACNC: 4.62 MIU/ML (ref 0.36–3.74)
WBC # BLD AUTO: 7.9 X10(3) UL (ref 4–11)

## 2021-09-17 PROCEDURE — 84439 ASSAY OF FREE THYROXINE: CPT

## 2021-09-17 PROCEDURE — 80053 COMPREHEN METABOLIC PANEL: CPT

## 2021-09-17 PROCEDURE — 36415 COLL VENOUS BLD VENIPUNCTURE: CPT

## 2021-09-17 PROCEDURE — 85025 COMPLETE CBC W/AUTO DIFF WBC: CPT

## 2021-09-17 PROCEDURE — 84443 ASSAY THYROID STIM HORMONE: CPT

## 2021-09-22 ENCOUNTER — APPOINTMENT (OUTPATIENT)
Dept: CARDIOLOGY | Age: 86
End: 2021-09-22

## 2021-12-10 ENCOUNTER — LAB ENCOUNTER (OUTPATIENT)
Dept: LAB | Age: 86
End: 2021-12-10
Attending: INTERNAL MEDICINE
Payer: MEDICARE

## 2021-12-10 DIAGNOSIS — I65.23 BILATERAL CAROTID ARTERY STENOSIS: ICD-10-CM

## 2021-12-10 DIAGNOSIS — Z86.79 PERSONAL HISTORY OF UNSPECIFIED CIRCULATORY DISEASE: Primary | ICD-10-CM

## 2021-12-10 DIAGNOSIS — I42.0 DILATED CARDIOMYOPATHY (HCC): ICD-10-CM

## 2021-12-10 DIAGNOSIS — Z79.899 ENCOUNTER FOR LONG-TERM (CURRENT) DRUG USE: ICD-10-CM

## 2021-12-10 PROCEDURE — 80053 COMPREHEN METABOLIC PANEL: CPT

## 2021-12-10 PROCEDURE — 84443 ASSAY THYROID STIM HORMONE: CPT

## 2021-12-10 PROCEDURE — 36415 COLL VENOUS BLD VENIPUNCTURE: CPT

## 2022-03-04 ENCOUNTER — LAB ENCOUNTER (OUTPATIENT)
Dept: LAB | Age: 87
End: 2022-03-04
Attending: INTERNAL MEDICINE
Payer: MEDICARE

## 2022-03-04 DIAGNOSIS — Z01.818 PREOP EXAMINATION: ICD-10-CM

## 2022-03-04 DIAGNOSIS — Z11.59 SCREENING FOR VIRAL DISEASE: ICD-10-CM

## 2022-03-05 LAB — SARS-COV-2 RNA RESP QL NAA+PROBE: NOT DETECTED

## 2022-03-07 ENCOUNTER — APPOINTMENT (OUTPATIENT)
Dept: LAB | Facility: HOSPITAL | Age: 87
End: 2022-03-07
Attending: INTERNAL MEDICINE
Payer: MEDICARE

## 2022-03-07 ENCOUNTER — RT VISIT (OUTPATIENT)
Dept: RESPIRATORY THERAPY | Facility: HOSPITAL | Age: 87
End: 2022-03-07
Attending: INTERNAL MEDICINE
Payer: MEDICARE

## 2022-03-07 DIAGNOSIS — G45.9 TRANSIENT ISCHEMIC ATTACK: ICD-10-CM

## 2022-03-07 DIAGNOSIS — Z86.718 HISTORY OF DVT (DEEP VEIN THROMBOSIS): ICD-10-CM

## 2022-03-07 DIAGNOSIS — R53.83 FATIGUE: ICD-10-CM

## 2022-03-07 DIAGNOSIS — Z86.718 HISTORY OF BLOOD CLOTS: ICD-10-CM

## 2022-03-07 DIAGNOSIS — R06.09 OTHER FORMS OF DYSPNEA: ICD-10-CM

## 2022-03-07 PROCEDURE — 94729 DIFFUSING CAPACITY: CPT

## 2022-03-07 PROCEDURE — 94726 PLETHYSMOGRAPHY LUNG VOLUMES: CPT

## 2022-03-07 PROCEDURE — 94010 BREATHING CAPACITY TEST: CPT

## 2022-03-07 NOTE — PROCEDURES
Findings:  FEV1 is 3.22L, 121% predicted. FVC is 4.01L, 110% predicted. FEV1/ FVC ratio is 0.80. The flow-volume loop demonstrates a normal pattern. The TLC is 6.73L, 94% predicted. The residual volume 2.72L, 90% predicted. The diffusion capacity is 71% predicted and 76% predicted when corrected for alveolar volume. Impression:  There is no airway obstruction on spirometry and visualized on flow-volume loop. Lung volumes are normal.  Diffusion capacity is mildly reduced with DLCO of 71%. The isolated decreased in diffusion capacity can be seen in interstitial lung disease, pulmonary vascular disease (such as pulmonary hypertension) and anemia. If not already performed, would suggest further evaluation as determined clinically. There are no previous pulmonary function tests available for comparison.

## 2022-03-28 ENCOUNTER — LAB ENCOUNTER (OUTPATIENT)
Dept: LAB | Age: 87
End: 2022-03-28
Attending: INTERNAL MEDICINE
Payer: MEDICARE

## 2022-03-28 DIAGNOSIS — I51.89 COMBINED SYSTOLIC AND DIASTOLIC CARDIAC DYSFUNCTION: ICD-10-CM

## 2022-03-28 DIAGNOSIS — I65.23 BILATERAL CAROTID ARTERY STENOSIS: Primary | ICD-10-CM

## 2022-03-28 DIAGNOSIS — I10 HTN (HYPERTENSION): ICD-10-CM

## 2022-03-28 DIAGNOSIS — E78.5 DYSLIPIDEMIA: ICD-10-CM

## 2022-03-28 LAB
ALBUMIN SERPL-MCNC: 3.6 G/DL (ref 3.4–5)
ALBUMIN/GLOB SERPL: 1.1 {RATIO} (ref 1–2)
ALP LIVER SERPL-CCNC: 101 U/L
ALT SERPL-CCNC: 17 U/L
ANION GAP SERPL CALC-SCNC: 4 MMOL/L (ref 0–18)
AST SERPL-CCNC: 16 U/L (ref 15–37)
BASOPHILS # BLD AUTO: 0.05 X10(3) UL (ref 0–0.2)
BASOPHILS NFR BLD AUTO: 0.7 %
BILIRUB SERPL-MCNC: 0.9 MG/DL (ref 0.1–2)
BUN BLD-MCNC: 13 MG/DL (ref 7–18)
CALCIUM BLD-MCNC: 9.2 MG/DL (ref 8.5–10.1)
CHLORIDE SERPL-SCNC: 109 MMOL/L (ref 98–112)
CHOLEST SERPL-MCNC: 204 MG/DL (ref ?–200)
CO2 SERPL-SCNC: 30 MMOL/L (ref 21–32)
CREAT BLD-MCNC: 1.08 MG/DL
EOSINOPHIL # BLD AUTO: 0.33 X10(3) UL (ref 0–0.7)
EOSINOPHIL NFR BLD AUTO: 4.4 %
ERYTHROCYTE [DISTWIDTH] IN BLOOD BY AUTOMATED COUNT: 14.1 %
FASTING PATIENT LIPID ANSWER: YES
FASTING STATUS PATIENT QL REPORTED: YES
GLOBULIN PLAS-MCNC: 3.4 G/DL (ref 2.8–4.4)
GLUCOSE BLD-MCNC: 117 MG/DL (ref 70–99)
HCT VFR BLD AUTO: 42 %
HDLC SERPL-MCNC: 57 MG/DL (ref 40–59)
HGB BLD-MCNC: 13.8 G/DL
IMM GRANULOCYTES # BLD AUTO: 0.03 X10(3) UL (ref 0–1)
IMM GRANULOCYTES NFR BLD: 0.4 %
LYMPHOCYTES # BLD AUTO: 1.76 X10(3) UL (ref 1–4)
LYMPHOCYTES NFR BLD AUTO: 23.4 %
MCH RBC QN AUTO: 32.5 PG (ref 26–34)
MCHC RBC AUTO-ENTMCNC: 32.9 G/DL (ref 31–37)
MCV RBC AUTO: 99.1 FL
MONOCYTES # BLD AUTO: 0.73 X10(3) UL (ref 0.1–1)
MONOCYTES NFR BLD AUTO: 9.7 %
NEUTROPHILS # BLD AUTO: 4.61 X10 (3) UL (ref 1.5–7.7)
NEUTROPHILS # BLD AUTO: 4.61 X10(3) UL (ref 1.5–7.7)
NEUTROPHILS NFR BLD AUTO: 61.4 %
NONHDLC SERPL-MCNC: 147 MG/DL (ref ?–130)
NT-PROBNP SERPL-MCNC: 942 PG/ML (ref ?–450)
PLATELET # BLD AUTO: 297 10(3)UL (ref 150–450)
POTASSIUM SERPL-SCNC: 4.2 MMOL/L (ref 3.5–5.1)
PROT SERPL-MCNC: 7 G/DL (ref 6.4–8.2)
RBC # BLD AUTO: 4.24 X10(6)UL
SODIUM SERPL-SCNC: 143 MMOL/L (ref 136–145)
TRIGL SERPL-MCNC: 82 MG/DL (ref 30–149)
TSI SER-ACNC: 4.76 MIU/ML (ref 0.36–3.74)
VLDLC SERPL CALC-MCNC: 15 MG/DL (ref 0–30)
WBC # BLD AUTO: 7.5 X10(3) UL (ref 4–11)

## 2022-03-28 PROCEDURE — 80053 COMPREHEN METABOLIC PANEL: CPT

## 2022-03-28 PROCEDURE — 84443 ASSAY THYROID STIM HORMONE: CPT

## 2022-03-28 PROCEDURE — 83880 ASSAY OF NATRIURETIC PEPTIDE: CPT

## 2022-03-28 PROCEDURE — 85025 COMPLETE CBC W/AUTO DIFF WBC: CPT

## 2022-03-28 PROCEDURE — 36415 COLL VENOUS BLD VENIPUNCTURE: CPT

## 2022-03-28 PROCEDURE — 80061 LIPID PANEL: CPT

## 2022-04-25 ENCOUNTER — LAB ENCOUNTER (OUTPATIENT)
Dept: LAB | Age: 87
End: 2022-04-25
Attending: INTERNAL MEDICINE
Payer: MEDICARE

## 2022-04-25 DIAGNOSIS — R60.9 EDEMA: ICD-10-CM

## 2022-04-25 DIAGNOSIS — I51.89 COMBINED SYSTOLIC AND DIASTOLIC CARDIAC DYSFUNCTION: Primary | ICD-10-CM

## 2022-04-25 DIAGNOSIS — I42.0 DILATED CARDIOMYOPATHY (HCC): ICD-10-CM

## 2022-04-25 DIAGNOSIS — R53.83 FATIGUE: ICD-10-CM

## 2022-04-25 DIAGNOSIS — I10 HTN (HYPERTENSION): ICD-10-CM

## 2022-04-25 DIAGNOSIS — R06.09 OTHER FORM OF DYSPNEA: ICD-10-CM

## 2022-04-25 LAB
ANION GAP SERPL CALC-SCNC: 4 MMOL/L (ref 0–18)
BUN BLD-MCNC: 14 MG/DL (ref 7–18)
CALCIUM BLD-MCNC: 9.1 MG/DL (ref 8.5–10.1)
CHLORIDE SERPL-SCNC: 110 MMOL/L (ref 98–112)
CO2 SERPL-SCNC: 29 MMOL/L (ref 21–32)
CREAT BLD-MCNC: 1.02 MG/DL
FASTING STATUS PATIENT QL REPORTED: YES
GLUCOSE BLD-MCNC: 115 MG/DL (ref 70–99)
NT-PROBNP SERPL-MCNC: 604 PG/ML (ref ?–450)
OSMOLALITY SERPL CALC.SUM OF ELEC: 297 MOSM/KG (ref 275–295)
POTASSIUM SERPL-SCNC: 4.1 MMOL/L (ref 3.5–5.1)
SODIUM SERPL-SCNC: 143 MMOL/L (ref 136–145)

## 2022-04-25 PROCEDURE — 83880 ASSAY OF NATRIURETIC PEPTIDE: CPT

## 2022-04-25 PROCEDURE — 80048 BASIC METABOLIC PNL TOTAL CA: CPT

## 2022-04-25 PROCEDURE — 36415 COLL VENOUS BLD VENIPUNCTURE: CPT

## 2022-05-17 ENCOUNTER — LAB ENCOUNTER (OUTPATIENT)
Dept: LAB | Age: 87
End: 2022-05-17
Attending: NURSE PRACTITIONER
Payer: MEDICARE

## 2022-05-17 DIAGNOSIS — I10 HTN (HYPERTENSION): ICD-10-CM

## 2022-05-17 DIAGNOSIS — R06.00 DYSPNEA: ICD-10-CM

## 2022-05-17 DIAGNOSIS — I51.89 COMBINED SYSTOLIC AND DIASTOLIC CARDIAC DYSFUNCTION: Primary | ICD-10-CM

## 2022-05-17 LAB
ANION GAP SERPL CALC-SCNC: 4 MMOL/L (ref 0–18)
BUN BLD-MCNC: 13 MG/DL (ref 7–18)
CALCIUM BLD-MCNC: 8.9 MG/DL (ref 8.5–10.1)
CHLORIDE SERPL-SCNC: 111 MMOL/L (ref 98–112)
CO2 SERPL-SCNC: 28 MMOL/L (ref 21–32)
CREAT BLD-MCNC: 1.19 MG/DL
FASTING STATUS PATIENT QL REPORTED: YES
GLUCOSE BLD-MCNC: 108 MG/DL (ref 70–99)
NT-PROBNP SERPL-MCNC: 583 PG/ML (ref ?–450)
OSMOLALITY SERPL CALC.SUM OF ELEC: 297 MOSM/KG (ref 275–295)
POTASSIUM SERPL-SCNC: 4.4 MMOL/L (ref 3.5–5.1)
SODIUM SERPL-SCNC: 143 MMOL/L (ref 136–145)

## 2022-05-17 PROCEDURE — 36415 COLL VENOUS BLD VENIPUNCTURE: CPT

## 2022-05-17 PROCEDURE — 80048 BASIC METABOLIC PNL TOTAL CA: CPT

## 2022-05-17 PROCEDURE — 83880 ASSAY OF NATRIURETIC PEPTIDE: CPT

## 2022-09-07 ENCOUNTER — LAB ENCOUNTER (OUTPATIENT)
Dept: LAB | Age: 87
End: 2022-09-07
Attending: NURSE PRACTITIONER
Payer: MEDICARE

## 2022-09-07 DIAGNOSIS — R53.83 FATIGUE: ICD-10-CM

## 2022-09-07 DIAGNOSIS — I42.0 DILATED CARDIOMYOPATHY (HCC): ICD-10-CM

## 2022-09-07 DIAGNOSIS — I10 HTN (HYPERTENSION): ICD-10-CM

## 2022-09-07 DIAGNOSIS — I51.89 COMBINED SYSTOLIC AND DIASTOLIC CARDIAC DYSFUNCTION: Primary | ICD-10-CM

## 2022-09-07 DIAGNOSIS — I48.91 UNSPECIFIED ATRIAL FIBRILLATION (HCC): ICD-10-CM

## 2022-09-07 LAB
ALBUMIN SERPL-MCNC: 3.6 G/DL (ref 3.4–5)
ALBUMIN/GLOB SERPL: 1.1 {RATIO} (ref 1–2)
ALP LIVER SERPL-CCNC: 70 U/L
ALT SERPL-CCNC: 17 U/L
ANION GAP SERPL CALC-SCNC: 5 MMOL/L (ref 0–18)
AST SERPL-CCNC: 16 U/L (ref 15–37)
BASOPHILS # BLD AUTO: 0.04 X10(3) UL (ref 0–0.2)
BASOPHILS NFR BLD AUTO: 0.5 %
BILIRUB SERPL-MCNC: 0.8 MG/DL (ref 0.1–2)
BUN BLD-MCNC: 22 MG/DL (ref 7–18)
CALCIUM BLD-MCNC: 9 MG/DL (ref 8.5–10.1)
CHLORIDE SERPL-SCNC: 111 MMOL/L (ref 98–112)
CO2 SERPL-SCNC: 26 MMOL/L (ref 21–32)
CREAT BLD-MCNC: 1.41 MG/DL
EOSINOPHIL # BLD AUTO: 0.21 X10(3) UL (ref 0–0.7)
EOSINOPHIL NFR BLD AUTO: 2.7 %
ERYTHROCYTE [DISTWIDTH] IN BLOOD BY AUTOMATED COUNT: 14.3 %
FASTING STATUS PATIENT QL REPORTED: YES
GFR SERPLBLD BASED ON 1.73 SQ M-ARVRAT: 48 ML/MIN/1.73M2 (ref 60–?)
GLOBULIN PLAS-MCNC: 3.2 G/DL (ref 2.8–4.4)
GLUCOSE BLD-MCNC: 118 MG/DL (ref 70–99)
HCT VFR BLD AUTO: 39.2 %
HGB BLD-MCNC: 13 G/DL
IMM GRANULOCYTES # BLD AUTO: 0.02 X10(3) UL (ref 0–1)
IMM GRANULOCYTES NFR BLD: 0.3 %
LYMPHOCYTES # BLD AUTO: 1.96 X10(3) UL (ref 1–4)
LYMPHOCYTES NFR BLD AUTO: 25 %
MCH RBC QN AUTO: 33.8 PG (ref 26–34)
MCHC RBC AUTO-ENTMCNC: 33.2 G/DL (ref 31–37)
MCV RBC AUTO: 101.8 FL
MONOCYTES # BLD AUTO: 0.69 X10(3) UL (ref 0.1–1)
MONOCYTES NFR BLD AUTO: 8.8 %
NEUTROPHILS # BLD AUTO: 4.92 X10 (3) UL (ref 1.5–7.7)
NEUTROPHILS # BLD AUTO: 4.92 X10(3) UL (ref 1.5–7.7)
NEUTROPHILS NFR BLD AUTO: 62.7 %
NT-PROBNP SERPL-MCNC: 1580 PG/ML (ref ?–450)
OSMOLALITY SERPL CALC.SUM OF ELEC: 298 MOSM/KG (ref 275–295)
PLATELET # BLD AUTO: 277 10(3)UL (ref 150–450)
POTASSIUM SERPL-SCNC: 4.8 MMOL/L (ref 3.5–5.1)
PROT SERPL-MCNC: 6.8 G/DL (ref 6.4–8.2)
RBC # BLD AUTO: 3.85 X10(6)UL
SODIUM SERPL-SCNC: 142 MMOL/L (ref 136–145)
TSI SER-ACNC: 4.94 MIU/ML (ref 0.36–3.74)
WBC # BLD AUTO: 7.8 X10(3) UL (ref 4–11)

## 2022-09-07 PROCEDURE — 83880 ASSAY OF NATRIURETIC PEPTIDE: CPT

## 2022-09-07 PROCEDURE — 84443 ASSAY THYROID STIM HORMONE: CPT

## 2022-09-07 PROCEDURE — 36415 COLL VENOUS BLD VENIPUNCTURE: CPT

## 2022-09-07 PROCEDURE — 85025 COMPLETE CBC W/AUTO DIFF WBC: CPT

## 2022-09-07 PROCEDURE — 80053 COMPREHEN METABOLIC PANEL: CPT

## 2022-10-28 ENCOUNTER — ORDER TRANSCRIPTION (OUTPATIENT)
Dept: ADMINISTRATIVE | Facility: HOSPITAL | Age: 87
End: 2022-10-28

## 2022-10-28 DIAGNOSIS — Z01.812 ENCOUNTER FOR PREPROCEDURE SCREENING LABORATORY TESTING FOR COVID-19: Primary | ICD-10-CM

## 2022-10-28 DIAGNOSIS — Z20.822 ENCOUNTER FOR PREPROCEDURE SCREENING LABORATORY TESTING FOR COVID-19: Primary | ICD-10-CM

## 2022-11-21 RX ORDER — FAMOTIDINE 20 MG/1
20 TABLET, FILM COATED ORAL
COMMUNITY

## 2022-11-21 RX ORDER — METOPROLOL SUCCINATE 50 MG/1
50 TABLET, EXTENDED RELEASE ORAL DAILY
COMMUNITY

## 2022-11-21 RX ORDER — FINASTERIDE 5 MG/1
5 TABLET, FILM COATED ORAL DAILY
COMMUNITY

## 2022-11-21 RX ORDER — GLUCOSAM/CHON-MSM1/C/MANG/BOSW 750-644 MG
1 TABLET ORAL DAILY
COMMUNITY

## 2022-11-25 ENCOUNTER — LAB ENCOUNTER (OUTPATIENT)
Dept: LAB | Age: 87
End: 2022-11-25
Attending: INTERNAL MEDICINE
Payer: MEDICARE

## 2022-11-25 ENCOUNTER — HOSPITAL ENCOUNTER (OUTPATIENT)
Dept: GENERAL RADIOLOGY | Age: 87
Discharge: HOME | End: 2022-11-25
Attending: INTERNAL MEDICINE
Payer: MEDICARE

## 2022-11-25 DIAGNOSIS — Z01.818 PREOP TESTING: Primary | ICD-10-CM

## 2022-11-25 DIAGNOSIS — Z01.818 PREOP TESTING: ICD-10-CM

## 2022-11-25 DIAGNOSIS — Z20.822 ENCOUNTER FOR PREPROCEDURE SCREENING LABORATORY TESTING FOR COVID-19: ICD-10-CM

## 2022-11-25 DIAGNOSIS — Z01.812 ENCOUNTER FOR PREPROCEDURE SCREENING LABORATORY TESTING FOR COVID-19: ICD-10-CM

## 2022-11-25 LAB
ANION GAP SERPL CALC-SCNC: 2 MMOL/L (ref 0–18)
BASOPHILS # BLD AUTO: 0.05 X10(3) UL (ref 0–0.2)
BASOPHILS NFR BLD AUTO: 0.7 %
BUN BLD-MCNC: 14 MG/DL (ref 7–18)
CALCIUM BLD-MCNC: 8.6 MG/DL (ref 8.5–10.1)
CHLORIDE SERPL-SCNC: 112 MMOL/L (ref 98–112)
CO2 SERPL-SCNC: 32 MMOL/L (ref 21–32)
CREAT BLD-MCNC: 0.97 MG/DL
EOSINOPHIL # BLD AUTO: 0.41 X10(3) UL (ref 0–0.7)
EOSINOPHIL NFR BLD AUTO: 5.6 %
ERYTHROCYTE [DISTWIDTH] IN BLOOD BY AUTOMATED COUNT: 13.6 %
FASTING STATUS PATIENT QL REPORTED: YES
GFR SERPLBLD BASED ON 1.73 SQ M-ARVRAT: 75 ML/MIN/1.73M2 (ref 60–?)
GLUCOSE BLD-MCNC: 112 MG/DL (ref 70–99)
HCT VFR BLD AUTO: 37.5 %
HGB BLD-MCNC: 12.3 G/DL
IMM GRANULOCYTES # BLD AUTO: 0.01 X10(3) UL (ref 0–1)
IMM GRANULOCYTES NFR BLD: 0.1 %
LYMPHOCYTES # BLD AUTO: 1.75 X10(3) UL (ref 1–4)
LYMPHOCYTES NFR BLD AUTO: 24.1 %
MCH RBC QN AUTO: 33.7 PG (ref 26–34)
MCHC RBC AUTO-ENTMCNC: 32.8 G/DL (ref 31–37)
MCV RBC AUTO: 102.7 FL
MONOCYTES # BLD AUTO: 0.84 X10(3) UL (ref 0.1–1)
MONOCYTES NFR BLD AUTO: 11.6 %
NEUTROPHILS # BLD AUTO: 4.2 X10 (3) UL (ref 1.5–7.7)
NEUTROPHILS # BLD AUTO: 4.2 X10(3) UL (ref 1.5–7.7)
NEUTROPHILS NFR BLD AUTO: 57.9 %
OSMOLALITY SERPL CALC.SUM OF ELEC: 303 MOSM/KG (ref 275–295)
PLATELET # BLD AUTO: 257 10(3)UL (ref 150–450)
POTASSIUM SERPL-SCNC: 4.1 MMOL/L (ref 3.5–5.1)
RBC # BLD AUTO: 3.65 X10(6)UL
SODIUM SERPL-SCNC: 146 MMOL/L (ref 136–145)
WBC # BLD AUTO: 7.3 X10(3) UL (ref 4–11)

## 2022-11-25 PROCEDURE — 85025 COMPLETE CBC W/AUTO DIFF WBC: CPT

## 2022-11-25 PROCEDURE — 71046 X-RAY EXAM CHEST 2 VIEWS: CPT | Performed by: INTERNAL MEDICINE

## 2022-11-25 PROCEDURE — 36415 COLL VENOUS BLD VENIPUNCTURE: CPT

## 2022-11-25 PROCEDURE — 80048 BASIC METABOLIC PNL TOTAL CA: CPT

## 2022-11-26 LAB — SARS-COV-2 RNA RESP QL NAA+PROBE: DETECTED

## 2022-11-28 ENCOUNTER — APPOINTMENT (OUTPATIENT)
Dept: GENERAL RADIOLOGY | Facility: HOSPITAL | Age: 87
End: 2022-11-28
Attending: INTERNAL MEDICINE
Payer: MEDICARE

## 2022-11-28 ENCOUNTER — HOSPITAL ENCOUNTER (OUTPATIENT)
Dept: INTERVENTIONAL RADIOLOGY/VASCULAR | Facility: HOSPITAL | Age: 87
Discharge: HOME | End: 2022-11-28
Attending: INTERNAL MEDICINE | Admitting: INTERNAL MEDICINE
Payer: MEDICARE

## 2022-11-28 VITALS
DIASTOLIC BLOOD PRESSURE: 76 MMHG | HEIGHT: 70 IN | TEMPERATURE: 97 F | RESPIRATION RATE: 14 BRPM | SYSTOLIC BLOOD PRESSURE: 167 MMHG | WEIGHT: 189 LBS | BODY MASS INDEX: 27.06 KG/M2 | HEART RATE: 59 BPM | OXYGEN SATURATION: 98 %

## 2022-11-28 DIAGNOSIS — R00.1 SINUS BRADYCARDIA: ICD-10-CM

## 2022-11-28 LAB
ATRIAL RATE: 68 BPM
Q-T INTERVAL: 512 MS
QRS DURATION: 136 MS
QTC CALCULATION (BEZET): 512 MS
R AXIS: 13 DEGREES
T AXIS: -78 DEGREES
VENTRICULAR RATE: 60 BPM

## 2022-11-28 PROCEDURE — 93005 ELECTROCARDIOGRAM TRACING: CPT

## 2022-11-28 PROCEDURE — 02HK3JZ INSERTION OF PACEMAKER LEAD INTO RIGHT VENTRICLE, PERCUTANEOUS APPROACH: ICD-10-PCS | Performed by: INTERNAL MEDICINE

## 2022-11-28 PROCEDURE — 71046 X-RAY EXAM CHEST 2 VIEWS: CPT | Performed by: INTERNAL MEDICINE

## 2022-11-28 PROCEDURE — 33208 INSRT HEART PM ATRIAL & VENT: CPT | Performed by: INTERNAL MEDICINE

## 2022-11-28 PROCEDURE — 99153 MOD SED SAME PHYS/QHP EA: CPT | Performed by: INTERNAL MEDICINE

## 2022-11-28 PROCEDURE — 0JH606Z INSERTION OF PACEMAKER, DUAL CHAMBER INTO CHEST SUBCUTANEOUS TISSUE AND FASCIA, OPEN APPROACH: ICD-10-PCS | Performed by: INTERNAL MEDICINE

## 2022-11-28 PROCEDURE — 99152 MOD SED SAME PHYS/QHP 5/>YRS: CPT | Performed by: INTERNAL MEDICINE

## 2022-11-28 PROCEDURE — 02H63JZ INSERTION OF PACEMAKER LEAD INTO RIGHT ATRIUM, PERCUTANEOUS APPROACH: ICD-10-PCS | Performed by: INTERNAL MEDICINE

## 2022-11-28 PROCEDURE — 93010 ELECTROCARDIOGRAM REPORT: CPT | Performed by: INTERNAL MEDICINE

## 2022-11-28 RX ORDER — LIDOCAINE HYDROCHLORIDE 10 MG/ML
INJECTION, SOLUTION EPIDURAL; INFILTRATION; INTRACAUDAL; PERINEURAL
Status: COMPLETED
Start: 2022-11-28 | End: 2022-11-28

## 2022-11-28 RX ORDER — CEFAZOLIN SODIUM/WATER 2 G/20 ML
SYRINGE (ML) INTRAVENOUS
Status: COMPLETED
Start: 2022-11-28 | End: 2022-11-28

## 2022-11-28 RX ORDER — CHLORHEXIDINE GLUCONATE 4 G/100ML
30 SOLUTION TOPICAL
Status: DISCONTINUED | OUTPATIENT
Start: 2022-11-29 | End: 2022-11-28 | Stop reason: HOSPADM

## 2022-11-28 RX ORDER — IODIXANOL 320 MG/ML
50 INJECTION, SOLUTION INTRAVASCULAR
Status: COMPLETED | OUTPATIENT
Start: 2022-11-28 | End: 2022-11-28

## 2022-11-28 RX ORDER — VANCOMYCIN HYDROCHLORIDE 1 G/20ML
INJECTION, POWDER, LYOPHILIZED, FOR SOLUTION INTRAVENOUS
Status: COMPLETED
Start: 2022-11-28 | End: 2022-11-28

## 2022-11-28 RX ORDER — SODIUM CHLORIDE 9 MG/ML
INJECTION, SOLUTION INTRAVENOUS
Status: DISCONTINUED | OUTPATIENT
Start: 2022-11-29 | End: 2022-11-28 | Stop reason: HOSPADM

## 2022-11-28 RX ORDER — MIDAZOLAM HYDROCHLORIDE 1 MG/ML
INJECTION INTRAMUSCULAR; INTRAVENOUS
Status: COMPLETED
Start: 2022-11-28 | End: 2022-11-28

## 2022-11-28 RX ORDER — CEFAZOLIN SODIUM/WATER 2 G/20 ML
2 SYRINGE (ML) INTRAVENOUS
Status: DISCONTINUED | OUTPATIENT
Start: 2022-11-28 | End: 2022-11-28 | Stop reason: HOSPADM

## 2022-11-28 RX ORDER — HEPARIN SODIUM 5000 [USP'U]/ML
INJECTION, SOLUTION INTRAVENOUS; SUBCUTANEOUS
Status: COMPLETED
Start: 2022-11-28 | End: 2022-11-28

## 2022-11-28 RX ORDER — ONDANSETRON 2 MG/ML
4 INJECTION INTRAMUSCULAR; INTRAVENOUS EVERY 6 HOURS PRN
Status: DISCONTINUED | OUTPATIENT
Start: 2022-11-28 | End: 2022-11-28

## 2022-11-28 RX ADMIN — IODIXANOL 5 ML: 320 INJECTION, SOLUTION INTRAVASCULAR at 11:18:00

## 2022-11-28 NOTE — PROCEDURES
[facility]  Pacemaker Implantation Procedure Note    Luis A Al Patient Status:  Outpatient    1934 MRN [id number]   Location [address] [facility] Attending Norma Dos Santos MD   Hosp Day # 0 PCP Jhonny Spence MD     OPERATION(S) PERFORMED:   1. Medtronic Dual-chamber pacemaker RV lead in the left bundle branch area implant for symptomatic sinus node dysfunction   2. Chest fluoroscopy. 3. Left upper extremity venography. : Katie Ro MD  INDICATION: Symptomatic sinus node dysfunction  COMPLICATIONS: None      ESTIMATED BLOOD LOSS: Minimal.  SEDATION: IV was maintained by RN. Patient was assessed by myself and the nursing staff, IV sedation (versed 2 mg and fentanyl 75 mcg) were administered during continuous ECG, pulse oximeter, and non-invasive hemodynamic monitoring. I was present from the time of sedation being started to the end of the procedure (10:27 AM to 11:20 AM). METHODS: The patient was brought to the EP lab in a fasting and nonsedated state after providing informed consent. IV sedation was administered during continuous ECG, pulse oximeter, and noninvasive hemodynamic monitoring. After administering 1% lidocaine for local anesthesia, an incision was made parallel to the left clavicle. The plane of the incision was extended to the prepectoral fascia. A pocket was formed. Access to the axillary vein was achieved via the extrathoracic approach with two separate punctures. The guidewires were advanced to the IVC. An RV lead was positioned in the RV septum in the area of the left bundle branch. That 69 ms. An atrial lead was positioned in the RA appendage. Local electrograms and pacing thresholds were measured. Pacing was performed at 10 v to rule out phrenic nerve stimulation. The leads were tied to the pre-pectoral fascia with 2-0 Ethibond. The pocket was irrigated with antibiotic solution.  Bleeding was sought for until hemostasis was achieved. The leads were connected to a pulse generator. They were coiled and placed into the pocket along with the pulse generator. The incision was closed in 3 layers using 2-0 and 3-0 Vicryl and a 4-0 subcuticular. Steri-Strips were applied followed by a sterile dressing. The left arm was placed in a sling and the patient was transported to telemetry in stable condition. There were no apparent intraoperative complications. CONCLUSIONS:   1. Status post successful implant of a dual-chamber conduction system pacing Medtronic permanent pacemaker with the ventricular lead in the left bundle branch area. Or symptomatic sinus node dysfunction   2. Adequate RA, RV pacing and sensing thresholds. 3.  Resume Eliquis in 4 days  4.   PA and lateral chest x-ray or discharge         Alia Holbrook MD  Cardiac Electrophysiology  [address] [place] [address]

## 2022-11-28 NOTE — PROGRESS NOTES
+ covid test, denies testing positive in the last 90 days, pt states his symptoms are that of the common cold, cough is better, but still congested, no fever. Symptoms started on Nov 17th, 2022. Instructed to stay home, I will call him in the morning with a plan after I discuss results with Dr. Fatimah Bhandari.

## 2022-11-28 NOTE — IVS NOTE
Patient discharged home post PPM insertion. Pt is able to sit up and ambulate without difficulty. Pt's vital signs are stable. Left chest site is dry and intact with no signs and symptoms of bleeding or hematoma. Pt tolerated food/fluids. Dr. Wagner Amaro and device rep spoke to pt/family post procedure. EKG and CXR completed. No pneumothorax noted on x-ray. Instructions provided and pt/family verbalized understanding. PIV removed. Patient discharged home in wheelchair with all belongings.

## 2023-04-04 ENCOUNTER — LAB ENCOUNTER (OUTPATIENT)
Dept: LAB | Age: 88
End: 2023-04-04
Attending: INTERNAL MEDICINE
Payer: MEDICARE

## 2023-04-04 DIAGNOSIS — I77.810 DILATATION OF THORACIC AORTA (HCC): ICD-10-CM

## 2023-04-04 DIAGNOSIS — E78.5 DYSLIPIDEMIA: ICD-10-CM

## 2023-04-04 DIAGNOSIS — I48.91 ATRIAL FIBRILLATION (HCC): ICD-10-CM

## 2023-04-04 DIAGNOSIS — I10 HYPERTENSION, ESSENTIAL: ICD-10-CM

## 2023-04-04 DIAGNOSIS — I42.0 DILATED CARDIOMYOPATHY (HCC): ICD-10-CM

## 2023-04-04 DIAGNOSIS — I65.23 CAROTID STENOSIS, BILATERAL: Primary | ICD-10-CM

## 2023-04-04 LAB
ALBUMIN SERPL-MCNC: 3.7 G/DL (ref 3.4–5)
ALBUMIN/GLOB SERPL: 1.1 {RATIO} (ref 1–2)
ALP LIVER SERPL-CCNC: 93 U/L
ALT SERPL-CCNC: 19 U/L
ANION GAP SERPL CALC-SCNC: 5 MMOL/L (ref 0–18)
AST SERPL-CCNC: 16 U/L (ref 15–37)
BASOPHILS # BLD AUTO: 0.04 X10(3) UL (ref 0–0.2)
BASOPHILS NFR BLD AUTO: 0.5 %
BILIRUB SERPL-MCNC: 0.9 MG/DL (ref 0.1–2)
BUN BLD-MCNC: 23 MG/DL (ref 7–18)
CALCIUM BLD-MCNC: 9 MG/DL (ref 8.5–10.1)
CHLORIDE SERPL-SCNC: 107 MMOL/L (ref 98–112)
CHOLEST SERPL-MCNC: 202 MG/DL (ref ?–200)
CO2 SERPL-SCNC: 27 MMOL/L (ref 21–32)
CREAT BLD-MCNC: 1.32 MG/DL
EOSINOPHIL # BLD AUTO: 0.39 X10(3) UL (ref 0–0.7)
EOSINOPHIL NFR BLD AUTO: 4.7 %
ERYTHROCYTE [DISTWIDTH] IN BLOOD BY AUTOMATED COUNT: 14.1 %
FASTING PATIENT LIPID ANSWER: YES
FASTING STATUS PATIENT QL REPORTED: YES
GFR SERPLBLD BASED ON 1.73 SQ M-ARVRAT: 52 ML/MIN/1.73M2 (ref 60–?)
GLOBULIN PLAS-MCNC: 3.5 G/DL (ref 2.8–4.4)
GLUCOSE BLD-MCNC: 101 MG/DL (ref 70–99)
HCT VFR BLD AUTO: 37 %
HDLC SERPL-MCNC: 51 MG/DL (ref 40–59)
HGB BLD-MCNC: 12.4 G/DL
IMM GRANULOCYTES # BLD AUTO: 0.02 X10(3) UL (ref 0–1)
IMM GRANULOCYTES NFR BLD: 0.2 %
LDLC SERPL CALC-MCNC: 129 MG/DL (ref ?–100)
LYMPHOCYTES # BLD AUTO: 1.86 X10(3) UL (ref 1–4)
LYMPHOCYTES NFR BLD AUTO: 22.6 %
MCH RBC QN AUTO: 32.9 PG (ref 26–34)
MCHC RBC AUTO-ENTMCNC: 33.5 G/DL (ref 31–37)
MCV RBC AUTO: 98.1 FL
MONOCYTES # BLD AUTO: 0.7 X10(3) UL (ref 0.1–1)
MONOCYTES NFR BLD AUTO: 8.5 %
NEUTROPHILS # BLD AUTO: 5.23 X10 (3) UL (ref 1.5–7.7)
NEUTROPHILS # BLD AUTO: 5.23 X10(3) UL (ref 1.5–7.7)
NEUTROPHILS NFR BLD AUTO: 63.5 %
NONHDLC SERPL-MCNC: 151 MG/DL (ref ?–130)
NT-PROBNP SERPL-MCNC: 491 PG/ML (ref ?–450)
OSMOLALITY SERPL CALC.SUM OF ELEC: 292 MOSM/KG (ref 275–295)
PLATELET # BLD AUTO: 270 10(3)UL (ref 150–450)
POTASSIUM SERPL-SCNC: 4.2 MMOL/L (ref 3.5–5.1)
PROT SERPL-MCNC: 7.2 G/DL (ref 6.4–8.2)
RBC # BLD AUTO: 3.77 X10(6)UL
SODIUM SERPL-SCNC: 139 MMOL/L (ref 136–145)
TRIGL SERPL-MCNC: 121 MG/DL (ref 30–149)
TSI SER-ACNC: 3.88 MIU/ML (ref 0.36–3.74)
VLDLC SERPL CALC-MCNC: 22 MG/DL (ref 0–30)
WBC # BLD AUTO: 8.2 X10(3) UL (ref 4–11)

## 2023-04-04 PROCEDURE — 85025 COMPLETE CBC W/AUTO DIFF WBC: CPT

## 2023-04-04 PROCEDURE — 80061 LIPID PANEL: CPT

## 2023-04-04 PROCEDURE — 83880 ASSAY OF NATRIURETIC PEPTIDE: CPT

## 2023-04-04 PROCEDURE — 80053 COMPREHEN METABOLIC PANEL: CPT

## 2023-04-04 PROCEDURE — 36415 COLL VENOUS BLD VENIPUNCTURE: CPT

## 2023-04-04 PROCEDURE — 84443 ASSAY THYROID STIM HORMONE: CPT

## 2023-09-11 ENCOUNTER — LAB ENCOUNTER (OUTPATIENT)
Dept: LAB | Age: 88
End: 2023-09-11
Attending: INTERNAL MEDICINE
Payer: MEDICARE

## 2023-09-11 DIAGNOSIS — R60.9 EDEMA: ICD-10-CM

## 2023-09-11 DIAGNOSIS — R53.83 FATIGUE: ICD-10-CM

## 2023-09-11 DIAGNOSIS — E78.5 HYPERLIPEMIA: ICD-10-CM

## 2023-09-11 DIAGNOSIS — I10 ESSENTIAL HYPERTENSION, MALIGNANT: Primary | ICD-10-CM

## 2023-09-11 LAB
ALBUMIN SERPL-MCNC: 3.5 G/DL (ref 3.4–5)
ALBUMIN/GLOB SERPL: 1.1 {RATIO} (ref 1–2)
ALP LIVER SERPL-CCNC: 85 U/L
ALT SERPL-CCNC: 19 U/L
ANION GAP SERPL CALC-SCNC: 5 MMOL/L (ref 0–18)
AST SERPL-CCNC: 14 U/L (ref 15–37)
BASOPHILS # BLD AUTO: 0.04 X10(3) UL (ref 0–0.2)
BASOPHILS NFR BLD AUTO: 0.5 %
BILIRUB SERPL-MCNC: 0.6 MG/DL (ref 0.1–2)
BUN BLD-MCNC: 28 MG/DL (ref 7–18)
CALCIUM BLD-MCNC: 8.6 MG/DL (ref 8.5–10.1)
CHLORIDE SERPL-SCNC: 112 MMOL/L (ref 98–112)
CHOLEST SERPL-MCNC: 182 MG/DL (ref ?–200)
CO2 SERPL-SCNC: 25 MMOL/L (ref 21–32)
CREAT BLD-MCNC: 1.38 MG/DL
EGFRCR SERPLBLD CKD-EPI 2021: 49 ML/MIN/1.73M2 (ref 60–?)
EOSINOPHIL # BLD AUTO: 0.24 X10(3) UL (ref 0–0.7)
EOSINOPHIL NFR BLD AUTO: 3.2 %
ERYTHROCYTE [DISTWIDTH] IN BLOOD BY AUTOMATED COUNT: 13.6 %
FASTING PATIENT LIPID ANSWER: YES
FASTING STATUS PATIENT QL REPORTED: YES
GLOBULIN PLAS-MCNC: 3.3 G/DL (ref 2.8–4.4)
GLUCOSE BLD-MCNC: 113 MG/DL (ref 70–99)
HCT VFR BLD AUTO: 34.7 %
HDLC SERPL-MCNC: 47 MG/DL (ref 40–59)
HGB BLD-MCNC: 11.6 G/DL
IMM GRANULOCYTES # BLD AUTO: 0.01 X10(3) UL (ref 0–1)
IMM GRANULOCYTES NFR BLD: 0.1 %
LDLC SERPL CALC-MCNC: 116 MG/DL (ref ?–100)
LYMPHOCYTES # BLD AUTO: 1.79 X10(3) UL (ref 1–4)
LYMPHOCYTES NFR BLD AUTO: 23.9 %
MCH RBC QN AUTO: 32.9 PG (ref 26–34)
MCHC RBC AUTO-ENTMCNC: 33.4 G/DL (ref 31–37)
MCV RBC AUTO: 98.3 FL
MONOCYTES # BLD AUTO: 0.69 X10(3) UL (ref 0.1–1)
MONOCYTES NFR BLD AUTO: 9.2 %
NEUTROPHILS # BLD AUTO: 4.71 X10 (3) UL (ref 1.5–7.7)
NEUTROPHILS # BLD AUTO: 4.71 X10(3) UL (ref 1.5–7.7)
NEUTROPHILS NFR BLD AUTO: 63.1 %
NONHDLC SERPL-MCNC: 135 MG/DL (ref ?–130)
OSMOLALITY SERPL CALC.SUM OF ELEC: 300 MOSM/KG (ref 275–295)
PLATELET # BLD AUTO: 271 10(3)UL (ref 150–450)
POTASSIUM SERPL-SCNC: 4.3 MMOL/L (ref 3.5–5.1)
PROT SERPL-MCNC: 6.8 G/DL (ref 6.4–8.2)
RBC # BLD AUTO: 3.53 X10(6)UL
SODIUM SERPL-SCNC: 142 MMOL/L (ref 136–145)
TRIGL SERPL-MCNC: 105 MG/DL (ref 30–149)
TSI SER-ACNC: 4.43 MIU/ML (ref 0.36–3.74)
VLDLC SERPL CALC-MCNC: 18 MG/DL (ref 0–30)
WBC # BLD AUTO: 7.5 X10(3) UL (ref 4–11)

## 2023-09-11 PROCEDURE — 36415 COLL VENOUS BLD VENIPUNCTURE: CPT

## 2023-09-11 PROCEDURE — 80061 LIPID PANEL: CPT

## 2023-09-11 PROCEDURE — 84443 ASSAY THYROID STIM HORMONE: CPT

## 2023-09-11 PROCEDURE — 80053 COMPREHEN METABOLIC PANEL: CPT

## 2023-09-11 PROCEDURE — 85025 COMPLETE CBC W/AUTO DIFF WBC: CPT

## 2023-09-12 ENCOUNTER — LAB ENCOUNTER (OUTPATIENT)
Dept: LAB | Age: 88
End: 2023-09-12
Attending: INTERNAL MEDICINE
Payer: MEDICARE

## 2023-09-12 ENCOUNTER — OFFICE VISIT (OUTPATIENT)
Dept: INTERNAL MEDICINE CLINIC | Facility: CLINIC | Age: 88
End: 2023-09-12
Payer: MEDICARE

## 2023-09-12 VITALS
HEIGHT: 70 IN | TEMPERATURE: 98 F | WEIGHT: 196.63 LBS | SYSTOLIC BLOOD PRESSURE: 130 MMHG | DIASTOLIC BLOOD PRESSURE: 70 MMHG | OXYGEN SATURATION: 91 % | BODY MASS INDEX: 28.15 KG/M2 | HEART RATE: 58 BPM

## 2023-09-12 DIAGNOSIS — E78.5 HYPERLIPEMIA: ICD-10-CM

## 2023-09-12 DIAGNOSIS — D64.9 ANEMIA, UNSPECIFIED TYPE: ICD-10-CM

## 2023-09-12 DIAGNOSIS — I48.91 ATRIAL FIBRILLATION STATUS POST CARDIOVERSION (HCC): ICD-10-CM

## 2023-09-12 DIAGNOSIS — R79.89 ABNORMAL TSH: ICD-10-CM

## 2023-09-12 DIAGNOSIS — I10 ESSENTIAL HYPERTENSION, MALIGNANT: ICD-10-CM

## 2023-09-12 DIAGNOSIS — E04.1 THYROID NODULE: Primary | ICD-10-CM

## 2023-09-12 DIAGNOSIS — R53.83 FATIGUE: ICD-10-CM

## 2023-09-12 DIAGNOSIS — R60.9 EDEMA: ICD-10-CM

## 2023-09-12 DIAGNOSIS — E04.1 THYROID NODULE: ICD-10-CM

## 2023-09-12 PROBLEM — J43.8 PARASEPTAL EMPHYSEMA (HCC): Status: ACTIVE | Noted: 2021-06-30

## 2023-09-12 PROBLEM — I65.23 BILATERAL CAROTID ARTERY STENOSIS: Status: ACTIVE | Noted: 2021-03-22

## 2023-09-12 PROBLEM — E78.01 FAMILIAL HYPERCHOLESTEROLEMIA: Status: ACTIVE | Noted: 2023-09-12

## 2023-09-12 PROBLEM — I51.89 COMBINED SYSTOLIC AND DIASTOLIC CARDIAC DYSFUNCTION: Status: ACTIVE | Noted: 2019-08-08

## 2023-09-12 PROBLEM — K59.00 CONSTIPATION: Status: ACTIVE | Noted: 2023-09-12

## 2023-09-12 PROBLEM — I42.0 DILATED CARDIOMYOPATHY (HCC): Status: ACTIVE | Noted: 2019-08-08

## 2023-09-12 PROBLEM — I77.810 DILATATION OF THORACIC AORTA: Status: ACTIVE | Noted: 2020-02-24

## 2023-09-12 PROBLEM — H53.9 ABNORMAL VISION: Status: ACTIVE | Noted: 2023-09-12

## 2023-09-12 PROBLEM — Z86.718 HISTORY OF DVT (DEEP VEIN THROMBOSIS): Status: ACTIVE | Noted: 2021-09-08

## 2023-09-12 PROBLEM — I77.810 DILATATION OF THORACIC AORTA (HCC): Status: ACTIVE | Noted: 2020-02-24

## 2023-09-12 LAB
FOLATE SERPL-MCNC: 23.8 NG/ML (ref 8.7–?)
IRON SATN MFR SERPL: 13 %
IRON SERPL-MCNC: 57 UG/DL
T3 SERPL-MCNC: 81 NG/DL (ref 60–181)
T4 FREE SERPL-MCNC: 1.2 NG/DL (ref 0.8–1.7)
THYROPEROXIDASE AB SERPL-ACNC: 29 U/ML (ref ?–60)
TIBC SERPL-MCNC: 441 UG/DL (ref 240–450)
TRANSFERRIN SERPL-MCNC: 296 MG/DL (ref 200–360)
TSI SER-ACNC: 4.47 MIU/ML (ref 0.36–3.74)

## 2023-09-12 PROCEDURE — 99204 OFFICE O/P NEW MOD 45 MIN: CPT | Performed by: INTERNAL MEDICINE

## 2023-09-12 PROCEDURE — 36415 COLL VENOUS BLD VENIPUNCTURE: CPT

## 2023-09-12 PROCEDURE — 84439 ASSAY OF FREE THYROXINE: CPT

## 2023-09-12 PROCEDURE — 82746 ASSAY OF FOLIC ACID SERUM: CPT

## 2023-09-12 PROCEDURE — 84480 ASSAY TRIIODOTHYRONINE (T3): CPT

## 2023-09-12 PROCEDURE — 83550 IRON BINDING TEST: CPT

## 2023-09-12 PROCEDURE — 86376 MICROSOMAL ANTIBODY EACH: CPT

## 2023-09-12 PROCEDURE — 83540 ASSAY OF IRON: CPT

## 2023-09-12 PROCEDURE — 84443 ASSAY THYROID STIM HORMONE: CPT

## 2023-09-12 PROCEDURE — 82607 VITAMIN B-12: CPT

## 2023-09-12 RX ORDER — SPIRONOLACTONE 25 MG/1
12.5 TABLET ORAL DAILY
COMMUNITY

## 2023-09-13 LAB — VIT B12 SERPL-MCNC: 326 PG/ML (ref 193–986)

## 2023-09-19 ENCOUNTER — PATIENT MESSAGE (OUTPATIENT)
Dept: INTERNAL MEDICINE CLINIC | Facility: CLINIC | Age: 88
End: 2023-09-19

## 2023-09-19 NOTE — TELEPHONE ENCOUNTER
From: Vilma Szymanski  To: Peter Berry  Sent: 9/19/2023 1:43 PM CDT  Subject: Pro Beta Natriuretic Peptide    The above blood test is NOT covered by Medicare.  I was presented with an Advanced Beneficiary Notice of Noncoverage (ABN) by the [facility] at the time I completed all the other blood work you had ordered and I declined the test.    Vilma Szymanski  [phone]

## 2023-09-21 ENCOUNTER — HOSPITAL ENCOUNTER (OUTPATIENT)
Dept: ULTRASOUND IMAGING | Age: 88
Discharge: HOME | End: 2023-09-21
Attending: INTERNAL MEDICINE
Payer: MEDICARE

## 2023-09-21 DIAGNOSIS — E04.1 THYROID NODULE: ICD-10-CM

## 2023-09-21 DIAGNOSIS — R79.89 ABNORMAL TSH: ICD-10-CM

## 2023-09-21 PROCEDURE — 76536 US EXAM OF HEAD AND NECK: CPT | Performed by: INTERNAL MEDICINE

## 2023-10-02 ENCOUNTER — OFFICE VISIT (OUTPATIENT)
Facility: CLINIC | Age: 88
End: 2023-10-02
Payer: MEDICARE

## 2023-10-02 VITALS
WEIGHT: 196 LBS | HEART RATE: 116 BPM | DIASTOLIC BLOOD PRESSURE: 76 MMHG | BODY MASS INDEX: 28 KG/M2 | OXYGEN SATURATION: 99 % | SYSTOLIC BLOOD PRESSURE: 124 MMHG

## 2023-10-02 DIAGNOSIS — E03.8 SUBCLINICAL HYPOTHYROIDISM: Primary | ICD-10-CM

## 2023-10-02 DIAGNOSIS — E04.2 MULTIPLE THYROID NODULES: ICD-10-CM

## 2023-10-02 PROCEDURE — 99214 OFFICE O/P EST MOD 30 MIN: CPT | Performed by: STUDENT IN AN ORGANIZED HEALTH CARE EDUCATION/TRAINING PROGRAM

## 2023-10-02 NOTE — PATIENT INSTRUCTIONS
We reviewed your thyroid imaging and your blood tests. You have thyroid nodules on both sides of your thyroid, but they are very small and not big enough to biopsy. They have likely been there for a long time and have a low risk of progressing to becoming an aggressive cancer. If you'd like, your primary care doctor can repeat ultrasounds every 2 years, however this would only be reasonable if you would consider surgery in the future. If not, it's reasonable not to follow them at all unless you feel swelling or growing in your neck. With regard to your thyroid function, your thyroid brain hormone (TSH) is slightly high but your actual thyroid hormones are totally normal. In this situation and particularly with your cardiac history, there is no indication to treat you. Your PCP can watch your thyroid levels every year and if your numbers change dramatically we can revisit things. Otherwise, there is no need to follow up with me but feel free to reach out any time with questions.      Return Visit   [X] No need for follow up, can follow up with PCP    [  ] After visit summary   [  ] Fasting/8AM labs  [  ] Central scheduling # for ultrasound/nuclear med/CT/MRI/DXA  [  ] Directions to 1st floor lab to collect urine collection jug/salivary cortisol tubes  [  ] Med rep info for:  [  ] Dental clearance form  [  ] Will need authorization for outside records  [  ] Give blood sugar log  [  ] Other:

## 2023-12-08 ENCOUNTER — LAB ENCOUNTER (OUTPATIENT)
Dept: LAB | Age: 88
End: 2023-12-08
Attending: INTERNAL MEDICINE
Payer: MEDICARE

## 2023-12-08 DIAGNOSIS — D64.9 ANEMIA, UNSPECIFIED TYPE: ICD-10-CM

## 2023-12-08 LAB
BASOPHILS # BLD AUTO: 0.04 X10(3) UL (ref 0–0.2)
BASOPHILS NFR BLD AUTO: 0.6 %
EOSINOPHIL # BLD AUTO: 0.27 X10(3) UL (ref 0–0.7)
EOSINOPHIL NFR BLD AUTO: 3.8 %
ERYTHROCYTE [DISTWIDTH] IN BLOOD BY AUTOMATED COUNT: 13.7 %
HCT VFR BLD AUTO: 36.3 %
HGB BLD-MCNC: 12.1 G/DL
IMM GRANULOCYTES # BLD AUTO: 0.02 X10(3) UL (ref 0–1)
IMM GRANULOCYTES NFR BLD: 0.3 %
IRON SATN MFR SERPL: 18 %
IRON SERPL-MCNC: 75 UG/DL
LYMPHOCYTES # BLD AUTO: 1.72 X10(3) UL (ref 1–4)
LYMPHOCYTES NFR BLD AUTO: 24.1 %
MCH RBC QN AUTO: 32.9 PG (ref 26–34)
MCHC RBC AUTO-ENTMCNC: 33.3 G/DL (ref 31–37)
MCV RBC AUTO: 98.6 FL
MONOCYTES # BLD AUTO: 0.73 X10(3) UL (ref 0.1–1)
MONOCYTES NFR BLD AUTO: 10.2 %
NEUTROPHILS # BLD AUTO: 4.37 X10 (3) UL (ref 1.5–7.7)
NEUTROPHILS # BLD AUTO: 4.37 X10(3) UL (ref 1.5–7.7)
NEUTROPHILS NFR BLD AUTO: 61 %
PLATELET # BLD AUTO: 273 10(3)UL (ref 150–450)
RBC # BLD AUTO: 3.68 X10(6)UL
TIBC SERPL-MCNC: 423 UG/DL (ref 240–450)
TRANSFERRIN SERPL-MCNC: 284 MG/DL (ref 200–360)
WBC # BLD AUTO: 7.2 X10(3) UL (ref 4–11)

## 2023-12-08 PROCEDURE — 85025 COMPLETE CBC W/AUTO DIFF WBC: CPT

## 2023-12-08 PROCEDURE — 83540 ASSAY OF IRON: CPT

## 2023-12-08 PROCEDURE — 36415 COLL VENOUS BLD VENIPUNCTURE: CPT

## 2023-12-08 PROCEDURE — 83550 IRON BINDING TEST: CPT

## 2023-12-12 ENCOUNTER — OFFICE VISIT (OUTPATIENT)
Dept: INTERNAL MEDICINE CLINIC | Facility: CLINIC | Age: 88
End: 2023-12-12
Payer: MEDICARE

## 2023-12-12 VITALS
OXYGEN SATURATION: 99 % | SYSTOLIC BLOOD PRESSURE: 120 MMHG | TEMPERATURE: 98 F | RESPIRATION RATE: 15 BRPM | BODY MASS INDEX: 29 KG/M2 | HEART RATE: 66 BPM | WEIGHT: 203.38 LBS | DIASTOLIC BLOOD PRESSURE: 60 MMHG

## 2023-12-12 DIAGNOSIS — R79.89 ABNORMAL TSH: ICD-10-CM

## 2023-12-12 DIAGNOSIS — K59.00 CONSTIPATION, UNSPECIFIED CONSTIPATION TYPE: ICD-10-CM

## 2023-12-12 DIAGNOSIS — N18.9 CHRONIC KIDNEY DISEASE, UNSPECIFIED CKD STAGE: ICD-10-CM

## 2023-12-12 DIAGNOSIS — D50.9 IRON DEFICIENCY ANEMIA, UNSPECIFIED IRON DEFICIENCY ANEMIA TYPE: Primary | ICD-10-CM

## 2023-12-12 PROCEDURE — 99214 OFFICE O/P EST MOD 30 MIN: CPT | Performed by: INTERNAL MEDICINE

## 2023-12-12 RX ORDER — TAMSULOSIN HYDROCHLORIDE 0.4 MG/1
CAPSULE ORAL
COMMUNITY
Start: 2023-11-13

## 2023-12-18 ENCOUNTER — LAB ENCOUNTER (OUTPATIENT)
Dept: LAB | Age: 88
End: 2023-12-18
Attending: INTERNAL MEDICINE
Payer: MEDICARE

## 2023-12-18 DIAGNOSIS — D50.9 IRON DEFICIENCY ANEMIA, UNSPECIFIED IRON DEFICIENCY ANEMIA TYPE: ICD-10-CM

## 2023-12-18 LAB — HEMOCCULT STL QL: NEGATIVE

## 2023-12-18 PROCEDURE — 82274 ASSAY TEST FOR BLOOD FECAL: CPT

## 2024-03-21 ENCOUNTER — ORDER TRANSCRIPTION (OUTPATIENT)
Dept: ADMINISTRATIVE | Facility: HOSPITAL | Age: 89
End: 2024-03-21

## 2024-03-21 DIAGNOSIS — Z79.899 ENCOUNTER FOR MONITORING AMIODARONE THERAPY: Primary | ICD-10-CM

## 2024-03-21 DIAGNOSIS — Z51.81 ENCOUNTER FOR MONITORING AMIODARONE THERAPY: Primary | ICD-10-CM

## 2024-04-11 ENCOUNTER — TELEPHONE (OUTPATIENT)
Dept: INTERNAL MEDICINE CLINIC | Facility: CLINIC | Age: 89
End: 2024-04-11

## 2024-04-11 NOTE — TELEPHONE ENCOUNTER
Patient requested paper copy of lab orders placed in December 2023. Printed copies placed in  accordion for patient . Patient states he will  on 4/12 in AM.

## 2024-04-12 ENCOUNTER — RT VISIT (OUTPATIENT)
Dept: RESPIRATORY THERAPY | Facility: HOSPITAL | Age: 89
End: 2024-04-12
Attending: INTERNAL MEDICINE
Payer: MEDICARE

## 2024-04-12 DIAGNOSIS — Z79.899 ENCOUNTER FOR MONITORING AMIODARONE THERAPY: ICD-10-CM

## 2024-04-12 DIAGNOSIS — Z51.81 ENCOUNTER FOR MONITORING AMIODARONE THERAPY: ICD-10-CM

## 2024-04-12 PROCEDURE — 94060 EVALUATION OF WHEEZING: CPT

## 2024-04-12 PROCEDURE — 94726 PLETHYSMOGRAPHY LUNG VOLUMES: CPT

## 2024-04-12 PROCEDURE — 94729 DIFFUSING CAPACITY: CPT

## 2024-04-15 NOTE — PROCEDURES
Findings:  Postbronchodilator FEV1 is 3.31L, z-score of 1.43.  Postbronchodilator FVC is 4.63L, z-score of 1.56.  FEV1/ FVC ratio is 0.71.  There is no significant bronchodilator response after administration of albuterol.   The flow-volume loop demonstrates a normal pattern.  The TLC is 7.12L, z-score of -0.01.  The residual volume 2.60L, z-score of -1.12.  The diffusion capacity is 15.45 with z-score of -1.90. When corrected for alveolar volume, the diffusion capacity is 3.08 with z-score of -0.31.    Impression:  There is no airway obstruction on spirometry and visualized on flow-volume loop.  There is no significant bronchodilator response, but this does not preclude treatment with bronchodilators.  Lung volumes are normal.  Diffusion capacity is mildly reduced but improves to normal when considering alveolar volume. This may represent a normal finding but can be seen in emphysema, interstitial lung disease, pulmonary vascular disease (such as pulmonary hypertension) and anemia. If not already performed, would suggest further evaluation as determined clinically.    When compared to previous pulmonary function testing dated 3/7/2022, there has been an increase in FEV1 (previously 3.22L, 121% predicted), FVC (previously 4.01L, 110% predicted) and total lung capacity (previously 6.73L, 94% predicted). There has been a decrease in diffusion capacity (previously DLCO 16.5, 71% predicted).  Of note, this testing was performed in accordance to ATS/ERS interpretation guidelines with the use of upper and lower limits of normal as well as z-score references. Previous testing (before March 2024) was not performed at [facility] using z-scores and so comparison to previous testing should be taken with caution.

## 2024-04-17 ENCOUNTER — LAB ENCOUNTER (OUTPATIENT)
Dept: LAB | Age: 89
End: 2024-04-17
Attending: INTERNAL MEDICINE
Payer: MEDICARE

## 2024-04-17 DIAGNOSIS — N18.9 CHRONIC KIDNEY DISEASE, UNSPECIFIED CKD STAGE: ICD-10-CM

## 2024-04-17 DIAGNOSIS — Z51.81 ENCOUNTER FOR THERAPEUTIC DRUG MONITORING: Primary | ICD-10-CM

## 2024-04-17 DIAGNOSIS — D50.9 IRON DEFICIENCY ANEMIA, UNSPECIFIED IRON DEFICIENCY ANEMIA TYPE: ICD-10-CM

## 2024-04-17 LAB
ALBUMIN SERPL-MCNC: 4.2 G/DL (ref 3.2–4.8)
ALBUMIN/GLOB SERPL: 1.6 {RATIO} (ref 1–2)
ALP LIVER SERPL-CCNC: 106 U/L
ALT SERPL-CCNC: 11 U/L
ANION GAP SERPL CALC-SCNC: 8 MMOL/L (ref 0–18)
ANION GAP SERPL CALC-SCNC: 8 MMOL/L (ref 0–18)
AST SERPL-CCNC: 19 U/L (ref ?–34)
BASOPHILS # BLD AUTO: 0.04 X10(3) UL (ref 0–0.2)
BASOPHILS NFR BLD AUTO: 0.6 %
BILIRUB SERPL-MCNC: 0.7 MG/DL (ref 0.2–1.1)
BUN BLD-MCNC: 20 MG/DL (ref 9–23)
BUN BLD-MCNC: 20 MG/DL (ref 9–23)
BUN/CREAT SERPL: 16.1 (ref 10–20)
BUN/CREAT SERPL: 16.1 (ref 10–20)
CALCIUM BLD-MCNC: 9.1 MG/DL (ref 8.7–10.4)
CALCIUM BLD-MCNC: 9.1 MG/DL (ref 8.7–10.4)
CHLORIDE SERPL-SCNC: 108 MMOL/L (ref 98–112)
CHLORIDE SERPL-SCNC: 108 MMOL/L (ref 98–112)
CO2 SERPL-SCNC: 28 MMOL/L (ref 21–32)
CO2 SERPL-SCNC: 28 MMOL/L (ref 21–32)
CREAT BLD-MCNC: 1.24 MG/DL
CREAT BLD-MCNC: 1.24 MG/DL
DEPRECATED RDW RBC AUTO: 47.5 FL (ref 35.1–46.3)
EGFRCR SERPLBLD CKD-EPI 2021: 56 ML/MIN/1.73M2 (ref 60–?)
EGFRCR SERPLBLD CKD-EPI 2021: 56 ML/MIN/1.73M2 (ref 60–?)
EOSINOPHIL # BLD AUTO: 0.21 X10(3) UL (ref 0–0.7)
EOSINOPHIL NFR BLD AUTO: 3 %
ERYTHROCYTE [DISTWIDTH] IN BLOOD BY AUTOMATED COUNT: 13.5 % (ref 11–15)
FASTING STATUS PATIENT QL REPORTED: NO
FASTING STATUS PATIENT QL REPORTED: NO
FOLATE SERPL-MCNC: >24 NG/ML (ref 5.4–?)
GLOBULIN PLAS-MCNC: 2.7 G/DL (ref 2.8–4.4)
GLUCOSE BLD-MCNC: 116 MG/DL (ref 70–99)
GLUCOSE BLD-MCNC: 116 MG/DL (ref 70–99)
HCT VFR BLD AUTO: 36.7 %
HGB BLD-MCNC: 12.7 G/DL
IMM GRANULOCYTES # BLD AUTO: 0.02 X10(3) UL (ref 0–1)
IMM GRANULOCYTES NFR BLD: 0.3 %
IRON SATN MFR SERPL: 25 %
IRON SERPL-MCNC: 98 UG/DL
LYMPHOCYTES # BLD AUTO: 1.58 X10(3) UL (ref 1–4)
LYMPHOCYTES NFR BLD AUTO: 22.3 %
MCH RBC QN AUTO: 33.1 PG (ref 26–34)
MCHC RBC AUTO-ENTMCNC: 34.6 G/DL (ref 31–37)
MCV RBC AUTO: 95.6 FL
MONOCYTES # BLD AUTO: 0.75 X10(3) UL (ref 0.1–1)
MONOCYTES NFR BLD AUTO: 10.6 %
NEUTROPHILS # BLD AUTO: 4.49 X10 (3) UL (ref 1.5–7.7)
NEUTROPHILS # BLD AUTO: 4.49 X10(3) UL (ref 1.5–7.7)
NEUTROPHILS NFR BLD AUTO: 63.2 %
OSMOLALITY SERPL CALC.SUM OF ELEC: 302 MOSM/KG (ref 275–295)
OSMOLALITY SERPL CALC.SUM OF ELEC: 302 MOSM/KG (ref 275–295)
PLATELET # BLD AUTO: 265 10(3)UL (ref 150–450)
POTASSIUM SERPL-SCNC: 4.1 MMOL/L (ref 3.5–5.1)
POTASSIUM SERPL-SCNC: 4.1 MMOL/L (ref 3.5–5.1)
PROT SERPL-MCNC: 6.9 G/DL (ref 5.7–8.2)
RBC # BLD AUTO: 3.84 X10(6)UL
SODIUM SERPL-SCNC: 144 MMOL/L (ref 136–145)
SODIUM SERPL-SCNC: 144 MMOL/L (ref 136–145)
TIBC SERPL-MCNC: 390 UG/DL (ref 250–425)
TRANSFERRIN SERPL-MCNC: 262 MG/DL (ref 215–365)
TSI SER-ACNC: 6.02 MIU/ML (ref 0.55–4.78)
VIT B12 SERPL-MCNC: 455 PG/ML (ref 211–911)
WBC # BLD AUTO: 7.1 X10(3) UL (ref 4–11)

## 2024-04-17 PROCEDURE — 83540 ASSAY OF IRON: CPT

## 2024-04-17 PROCEDURE — 84443 ASSAY THYROID STIM HORMONE: CPT

## 2024-04-17 PROCEDURE — 36415 COLL VENOUS BLD VENIPUNCTURE: CPT

## 2024-04-17 PROCEDURE — 85025 COMPLETE CBC W/AUTO DIFF WBC: CPT

## 2024-04-17 PROCEDURE — 84466 ASSAY OF TRANSFERRIN: CPT

## 2024-04-17 PROCEDURE — 82607 VITAMIN B-12: CPT

## 2024-04-17 PROCEDURE — 80053 COMPREHEN METABOLIC PANEL: CPT

## 2024-04-17 PROCEDURE — 82746 ASSAY OF FOLIC ACID SERUM: CPT

## 2024-04-18 DIAGNOSIS — R79.89 ABNORMAL TSH: ICD-10-CM

## 2024-04-18 DIAGNOSIS — Z12.5 ENCOUNTER FOR SCREENING FOR MALIGNANT NEOPLASM OF PROSTATE: ICD-10-CM

## 2024-04-18 DIAGNOSIS — D64.9 ANEMIA, UNSPECIFIED TYPE: Primary | ICD-10-CM

## 2024-04-23 ENCOUNTER — HOSPITAL ENCOUNTER (OUTPATIENT)
Dept: CT IMAGING | Age: 89
Discharge: HOME | End: 2024-04-23
Attending: INTERNAL MEDICINE
Payer: MEDICARE

## 2024-04-23 DIAGNOSIS — R06.9 RESPIRATORY ABNORMALITY, UNSPECIFIED: ICD-10-CM

## 2024-04-23 DIAGNOSIS — R06.00 DYSPNEA: ICD-10-CM

## 2024-04-23 DIAGNOSIS — I10 HTN (HYPERTENSION): ICD-10-CM

## 2024-04-23 PROCEDURE — 71250 CT THORAX DX C-: CPT | Performed by: INTERNAL MEDICINE

## 2024-05-21 ENCOUNTER — OFFICE VISIT (OUTPATIENT)
Dept: INTERNAL MEDICINE CLINIC | Facility: CLINIC | Age: 89
End: 2024-05-21

## 2024-05-21 VITALS
HEIGHT: 68 IN | DIASTOLIC BLOOD PRESSURE: 80 MMHG | SYSTOLIC BLOOD PRESSURE: 126 MMHG | HEART RATE: 77 BPM | OXYGEN SATURATION: 97 % | WEIGHT: 209.81 LBS | RESPIRATION RATE: 15 BRPM | BODY MASS INDEX: 31.8 KG/M2 | TEMPERATURE: 98 F

## 2024-05-21 DIAGNOSIS — J84.9 INTERSTITIAL LUNG DISEASE (HCC): ICD-10-CM

## 2024-05-21 DIAGNOSIS — I10 HYPERTENSION, UNSPECIFIED TYPE: ICD-10-CM

## 2024-05-21 DIAGNOSIS — D64.9 ANEMIA, UNSPECIFIED TYPE: Primary | ICD-10-CM

## 2024-05-21 DIAGNOSIS — I48.91 ATRIAL FIBRILLATION STATUS POST CARDIOVERSION (HCC): ICD-10-CM

## 2024-05-21 DIAGNOSIS — R79.89 ABNORMAL TSH: ICD-10-CM

## 2024-05-21 DIAGNOSIS — K59.00 CONSTIPATION, UNSPECIFIED CONSTIPATION TYPE: ICD-10-CM

## 2024-05-21 DIAGNOSIS — E78.01 FAMILIAL HYPERCHOLESTEROLEMIA: ICD-10-CM

## 2024-05-21 PROCEDURE — 99214 OFFICE O/P EST MOD 30 MIN: CPT | Performed by: INTERNAL MEDICINE

## 2024-05-21 PROCEDURE — G2211 COMPLEX E/M VISIT ADD ON: HCPCS | Performed by: INTERNAL MEDICINE

## 2024-05-21 NOTE — PROGRESS NOTES
Subjective:   Bernardino Palacios is a 89 year old male  who presents for Follow - Up     Reports doing well. No acute complaints.     Aortic aneurysm/CHF/carotid stenosis/HTN/afib s/p pacemaker- follows with cardiology   Pt currently wo symptoms. Reports feeling well.   Denies cp/palpitations. CT chest 4/2024 showed stable aneurysm.   interstitial  lung disease/emphysema- pt not interested in seeing pulm at this time. But considering it. Has referral. Denies symptoms   Pt reports being utd with pneumonia vaccines.     Abnormal TSH- on amiodarone .decreased dose recently with cards. saw endo and recommended yearly thyroid labs.     Anemia- improved from prior. On supplements.  thyroid could be contributing factor. FIT negative 12/2023. Has orders for follow-up labs.  No hematuria, rectal bleeding, abdominal pain.     BPH- follows with urology. Saw 5/16/24    History/Other:    Chief Complaint Reviewed and Verified  No Further Nursing Notes to   Review  Tobacco Reviewed  Allergies Reviewed  Medications Reviewed         Current Outpatient Medications   Medication Sig Dispense Refill    Iron Combinations (IRON COMPLEX OR) Take 65 mg by mouth daily.      NON FORMULARY Take by mouth as needed. [place] Mucil PRN      tamsulosin 0.4 MG Oral Cap       finasteride 5 MG Oral Tab Take 1 tablet (5 mg total) by mouth daily.      metoprolol succinate ER 50 MG Oral Tablet 24 Hr Take 1 tablet (50 mg total) by mouth daily.      latanoprost 0.005 % Ophthalmic Solution Place 1 drop into both eyes nightly.      lisinopril 5 MG Oral Tab Take 1 tablet (5 mg total) by mouth daily.      amiodarone HCl 200 MG Oral Tab Take 0.5 tablets (100 mg total) by mouth daily.      apixaban 5 MG Oral Tab Take 1 tablet (5 mg total) by mouth 2 (two) times daily.      furosemide 20 MG Oral Tab Take 1 tablet (20 mg total) by mouth daily.      Brimonidine Tartrate (ALPHAGAN) 0.15 % Ophthalmic Solution Place 1 drop into both eyes 2 (two) times daily.  3     Dorzolamide HCl-Timolol Mal (DORZOLAMIDE-TIMOLOL) 22.3-6.8 MG/ML Ophthalmic Solution 1 drop 2 (two) times daily.      Multiple Vitamins-Minerals (MULTI-VITAMIN/MINERALS) Oral Tab Take 1 tablet by mouth daily.         Review of Systems:  Pertinent items are noted in HPI.  A comprehensive 10 point review of systems was completed.  Pertinent positives and negatives noted in the the HPI.        Objective:   /80 (BP Location: Left arm, Patient Position: Sitting, Cuff Size: large)   Pulse 77   Temp 98.2 °F (36.8 °C) (Temporal)   Resp 15   Ht 5' 8\" (1.727 m)   Wt 209 lb 12.8 oz (95.2 kg)   SpO2 97%   BMI 31.90 kg/m²  Estimated body mass index is 31.9 kg/m² as calculated from the following:    Height as of this encounter: 5' 8\" (1.727 m).    Weight as of this encounter: 209 lb 12.8 oz (95.2 kg).  PHYSICAL EXAM:   General: no acute distress   Respiratory: no increased work of breathing; good air exchange; CTAB; no crackles or wheezing   Cardiovascular: RRR; S1, S2; no edema   Neurological: awake, alert, oriented x3; CNII-XII grossly intact;   Behavioral/Psych: euthymic; appropriate affect       Assessment & Plan:     Bernardino was seen today for follow - up.    Diagnoses and all orders for this visit:    Anemia, unspecified type  -as above. Improved from prior.   -supplements. Has orders for repeat labs    Abnormal TSH  -as per endo    Hypertension, unspecified type  -controlled. CPM    Familial hypercholesterolemia  -     Lipid Panel [E]; Future  -statin.     Afib-s/p cardioversion pt stable on eliquis, amiodarone, metoprolol and follows with cards    Constipation, unspecified constipation type  -miralax or metamucil daily  -if stops improving then to follow-up sooner     Interstitial lung disease/emphysema- as above. Has referral for pulm        Joselyn Shepherd MD

## 2024-07-26 RX ORDER — FINASTERIDE 5 MG/1
5 TABLET, FILM COATED ORAL DAILY
Qty: 90 TABLET | Refills: 3 | Status: SHIPPED | OUTPATIENT
Start: 2024-07-26

## 2024-07-26 NOTE — TELEPHONE ENCOUNTER
Protocol passed     LOV: 5/21/24   RTC: 20 weeks   Filled: 5/21/24 #90 by Gladys Maguire PA-C  Labs: 9/11/23   Future Appointments   Date Time Provider Department Center   9/16/2024  7:30 AM Steven Miranda MD [facility]   10/21/2024  8:00 AM Joselyn Torres MD [facility] [place]

## 2024-10-10 ENCOUNTER — LAB ENCOUNTER (OUTPATIENT)
Dept: LAB | Age: 89
End: 2024-10-10
Attending: INTERNAL MEDICINE
Payer: MEDICARE

## 2024-10-10 DIAGNOSIS — R79.89 ABNORMAL TSH: ICD-10-CM

## 2024-10-10 DIAGNOSIS — D64.9 ANEMIA, UNSPECIFIED TYPE: ICD-10-CM

## 2024-10-10 DIAGNOSIS — E78.01 FAMILIAL HYPERCHOLESTEROLEMIA: ICD-10-CM

## 2024-10-10 DIAGNOSIS — Z12.5 ENCOUNTER FOR SCREENING FOR MALIGNANT NEOPLASM OF PROSTATE: ICD-10-CM

## 2024-10-10 LAB
BASOPHILS # BLD AUTO: 0.03 X10(3) UL (ref 0–0.2)
BASOPHILS NFR BLD AUTO: 0.4 %
CHOLEST SERPL-MCNC: 209 MG/DL (ref ?–200)
COMPLEXED PSA SERPL-MCNC: 0.74 NG/ML (ref ?–4)
EOSINOPHIL # BLD AUTO: 0.27 X10(3) UL (ref 0–0.7)
EOSINOPHIL NFR BLD AUTO: 3.6 %
ERYTHROCYTE [DISTWIDTH] IN BLOOD BY AUTOMATED COUNT: 13.8 %
FASTING PATIENT LIPID ANSWER: YES
FOLATE SERPL-MCNC: 36.6 NG/ML (ref 5.4–?)
HCT VFR BLD AUTO: 38.4 %
HDLC SERPL-MCNC: 50 MG/DL (ref 40–59)
HGB BLD-MCNC: 12.9 G/DL
IMM GRANULOCYTES # BLD AUTO: 0.03 X10(3) UL (ref 0–1)
IMM GRANULOCYTES NFR BLD: 0.4 %
IRON SATN MFR SERPL: 29 %
IRON SERPL-MCNC: 101 UG/DL
LDLC SERPL CALC-MCNC: 143 MG/DL (ref ?–100)
LYMPHOCYTES # BLD AUTO: 1.72 X10(3) UL (ref 1–4)
LYMPHOCYTES NFR BLD AUTO: 22.6 %
MCH RBC QN AUTO: 33.2 PG (ref 26–34)
MCHC RBC AUTO-ENTMCNC: 33.6 G/DL (ref 31–37)
MCV RBC AUTO: 98.7 FL
MONOCYTES # BLD AUTO: 0.75 X10(3) UL (ref 0.1–1)
MONOCYTES NFR BLD AUTO: 9.9 %
NEUTROPHILS # BLD AUTO: 4.8 X10 (3) UL (ref 1.5–7.7)
NEUTROPHILS # BLD AUTO: 4.8 X10(3) UL (ref 1.5–7.7)
NEUTROPHILS NFR BLD AUTO: 63.1 %
NONHDLC SERPL-MCNC: 159 MG/DL (ref ?–130)
PLATELET # BLD AUTO: 262 10(3)UL (ref 150–450)
RBC # BLD AUTO: 3.89 X10(6)UL
T4 FREE SERPL-MCNC: 1.4 NG/DL (ref 0.8–1.7)
TOTAL IRON BINDING CAPACITY: 350 UG/DL (ref 250–425)
TRANSFERRIN SERPL-MCNC: 276 MG/DL (ref 215–365)
TRIGL SERPL-MCNC: 88 MG/DL (ref 30–149)
TSI SER-ACNC: 5.11 MIU/ML (ref 0.55–4.78)
VIT B12 SERPL-MCNC: 656 PG/ML (ref 211–911)
VLDLC SERPL CALC-MCNC: 16 MG/DL (ref 0–30)
WBC # BLD AUTO: 7.6 X10(3) UL (ref 4–11)

## 2024-10-10 PROCEDURE — 83550 IRON BINDING TEST: CPT

## 2024-10-10 PROCEDURE — 84439 ASSAY OF FREE THYROXINE: CPT

## 2024-10-10 PROCEDURE — 83540 ASSAY OF IRON: CPT

## 2024-10-10 PROCEDURE — 82607 VITAMIN B-12: CPT

## 2024-10-10 PROCEDURE — 36415 COLL VENOUS BLD VENIPUNCTURE: CPT

## 2024-10-10 PROCEDURE — 82746 ASSAY OF FOLIC ACID SERUM: CPT

## 2024-10-10 PROCEDURE — 85025 COMPLETE CBC W/AUTO DIFF WBC: CPT

## 2024-10-10 PROCEDURE — 84443 ASSAY THYROID STIM HORMONE: CPT

## 2024-10-10 PROCEDURE — 80061 LIPID PANEL: CPT

## 2024-10-21 ENCOUNTER — OFFICE VISIT (OUTPATIENT)
Dept: INTERNAL MEDICINE CLINIC | Facility: CLINIC | Age: 89
End: 2024-10-21
Payer: MEDICARE

## 2024-10-21 VITALS
WEIGHT: 206.19 LBS | RESPIRATION RATE: 15 BRPM | HEART RATE: 83 BPM | OXYGEN SATURATION: 97 % | SYSTOLIC BLOOD PRESSURE: 120 MMHG | DIASTOLIC BLOOD PRESSURE: 58 MMHG | BODY MASS INDEX: 31 KG/M2 | TEMPERATURE: 98 F

## 2024-10-21 DIAGNOSIS — D64.9 ANEMIA, UNSPECIFIED TYPE: Primary | ICD-10-CM

## 2024-10-21 DIAGNOSIS — I10 BENIGN ESSENTIAL HYPERTENSION: ICD-10-CM

## 2024-10-21 DIAGNOSIS — R79.89 ABNORMAL TSH: ICD-10-CM

## 2024-10-21 DIAGNOSIS — K59.00 CONSTIPATION, UNSPECIFIED CONSTIPATION TYPE: ICD-10-CM

## 2024-10-21 PROCEDURE — G2211 COMPLEX E/M VISIT ADD ON: HCPCS | Performed by: INTERNAL MEDICINE

## 2024-10-21 PROCEDURE — 99214 OFFICE O/P EST MOD 30 MIN: CPT | Performed by: INTERNAL MEDICINE

## 2024-10-21 RX ORDER — LACTULOSE 10 G/15ML
20 SOLUTION ORAL 2 TIMES DAILY PRN
Qty: 237 ML | Refills: 0 | Status: SHIPPED | OUTPATIENT
Start: 2024-10-21

## 2024-10-21 RX ORDER — DOCUSATE SODIUM 100 MG/1
100 CAPSULE, LIQUID FILLED ORAL 2 TIMES DAILY
Qty: 180 CAPSULE | Refills: 0 | Status: SHIPPED | OUTPATIENT
Start: 2024-10-21

## 2024-10-21 NOTE — PROGRESS NOTES
Subjective:   Bernardino Palacios is a 89 year old male  who presents for Follow - Up     Reports feeling good overall.  Anemia- improved from prior. On supplements.  thyroid could be contributing factor. FIT negative 12/2023.   Denies hematuria, abdominal pain.   But reports mild blood when constipated. No diffuse rectal bleeding.   Continue supplements and re-assess labs.   Constipation- could be from iron supplements.   Has tried miralax and metamucil   But just started to be more consistent with above and diet changes.     Aortic aneurysm/CHF/carotid stenosis/HTN/afib s/p pacemaker- follows with cardiology. Last saw 10/15/24.  BP controlled      Cholesterol is above goal but has myalgias with statin. Pt prefers to not be on more medication for this anymore.       Abnormal TSH- on amiodarone. Due for repeat thyroid labs ~4/2025 or sooner prn          History/Other:    Chief Complaint Reviewed and Verified  No Further Nursing Notes to   Review  Tobacco Reviewed  Allergies Reviewed  Medications Reviewed    Medical History Reviewed  Surgical History Reviewed  Family History   Reviewed  Social History Reviewed         Current Outpatient Medications   Medication Sig Dispense Refill    docusate sodium (COLACE) 100 MG Oral Cap Take 1 capsule (100 mg total) by mouth 2 (two) times daily. 180 capsule 0    lactulose 10 GM/15ML Oral Solution Take 30 mL (20 g total) by mouth 2 (two) times daily as needed. 237 mL 0    finasteride 5 MG Oral Tab Take 1 tablet (5 mg total) by mouth daily. 90 tablet 3    Iron Combinations (IRON COMPLEX OR) Take 65 mg by mouth daily.      NON FORMULARY Take by mouth as needed. [place] Mucil PRN      tamsulosin 0.4 MG Oral Cap       metoprolol succinate ER 50 MG Oral Tablet 24 Hr Take 1 tablet (50 mg total) by mouth daily.      latanoprost 0.005 % Ophthalmic Solution Place 1 drop into both eyes nightly.      lisinopril 5 MG Oral Tab Take 1 tablet (5 mg total) by mouth daily.      amiodarone HCl 200  MG Oral Tab Take 0.5 tablets (100 mg total) by mouth daily.      apixaban 5 MG Oral Tab Take 1 tablet (5 mg total) by mouth 2 (two) times daily.      furosemide 20 MG Oral Tab Take 1 tablet (20 mg total) by mouth daily.      Brimonidine Tartrate (ALPHAGAN) 0.15 % Ophthalmic Solution Place 1 drop into both eyes 2 (two) times daily.  3    Dorzolamide HCl-Timolol Mal (DORZOLAMIDE-TIMOLOL) 22.3-6.8 MG/ML Ophthalmic Solution 1 drop 2 (two) times daily.      Multiple Vitamins-Minerals (MULTI-VITAMIN/MINERALS) Oral Tab Take 1 tablet by mouth daily.         Review of Systems:  Pertinent items are noted in HPI.  A comprehensive 10 point review of systems was completed.  Pertinent positives and negatives noted in the the HPI.        Objective:   /58 (BP Location: Left arm, Patient Position: Sitting, Cuff Size: adult)   Pulse 83   Temp 98 °F (36.7 °C) (Temporal)   Resp 15   Wt 206 lb 3.2 oz (93.5 kg)   SpO2 97%   BMI 31.35 kg/m²  Estimated body mass index is 31.35 kg/m² as calculated from the following:    Height as of 5/21/24: 5' 8\" (1.727 m).    Weight as of this encounter: 206 lb 3.2 oz (93.5 kg).  PHYSICAL EXAM:   General: no acute distress   Respiratory: no increased work of breathing; good air exchange; CTAB; no crackles or wheezing   Cardiovascular: RRR; S1, S2; no edema   Gastrointestinal: normal bowel sounds; soft; non-distended; non-tender  Neurological: awake, alert, oriented x3; CNII-XII grossly intact;  Behavioral/Psych: euthymic; appropriate affect     Assessment & Plan:   Bernardino was seen today for follow - up.    Diagnoses and all orders for this visit:    Anemia, unspecified type  -     CBC W Differential W Platelet [E]; Future  -     Iron And Tibc; Future  -     B12 AND FOLATE; Future  -     Comp Metabolic Panel (14) [E]; Future  -     Gastro Referral - In Network  -     Ferritin [E]; Future  -hold iron and continue diet changes and re-assess as above    Constipation, unspecified constipation  type  -  hold iron for now  -   Gastro Referral - In Network  -     docusate sodium (COLACE) 100 MG Oral Cap; Take 1 capsule (100 mg total) by mouth 2 (two) times daily.  -  if no improvement then   lactulose 10 GM/15ML Oral Solution; Take 30 mL (20 g total) by mouth 2 (two) times daily as needed.  -follow-up labs     Benign essential hypertension  -controlled. CPM     Abnormal TSH  -as above repeat thyroid labs in ~4/2025 or sooner prn

## 2025-01-13 ENCOUNTER — LAB ENCOUNTER (OUTPATIENT)
Dept: LAB | Age: OVER 89
End: 2025-01-13
Attending: INTERNAL MEDICINE
Payer: MEDICARE

## 2025-01-13 DIAGNOSIS — D64.9 ANEMIA, UNSPECIFIED TYPE: ICD-10-CM

## 2025-01-13 LAB
ALBUMIN SERPL-MCNC: 4.2 G/DL (ref 3.2–4.8)
ALBUMIN/GLOB SERPL: 1.6 {RATIO} (ref 1–2)
ALP LIVER SERPL-CCNC: 92 U/L
ALT SERPL-CCNC: 16 U/L
ANION GAP SERPL CALC-SCNC: 6 MMOL/L (ref 0–18)
AST SERPL-CCNC: 22 U/L (ref ?–34)
BASOPHILS # BLD AUTO: 0.05 X10(3) UL (ref 0–0.2)
BASOPHILS NFR BLD AUTO: 0.7 %
BILIRUB SERPL-MCNC: 0.5 MG/DL (ref 0.2–0.9)
BUN BLD-MCNC: 23 MG/DL (ref 9–23)
CALCIUM BLD-MCNC: 9.2 MG/DL (ref 8.7–10.4)
CHLORIDE SERPL-SCNC: 109 MMOL/L (ref 98–112)
CO2 SERPL-SCNC: 26 MMOL/L (ref 21–32)
CREAT BLD-MCNC: 1.38 MG/DL
DEPRECATED HBV CORE AB SER IA-ACNC: 20 NG/ML
EGFRCR SERPLBLD CKD-EPI 2021: 49 ML/MIN/1.73M2 (ref 60–?)
EOSINOPHIL # BLD AUTO: 0.28 X10(3) UL (ref 0–0.7)
EOSINOPHIL NFR BLD AUTO: 3.9 %
ERYTHROCYTE [DISTWIDTH] IN BLOOD BY AUTOMATED COUNT: 13.2 %
FASTING STATUS PATIENT QL REPORTED: NO
FOLATE SERPL-MCNC: 19.8 NG/ML (ref 5.4–?)
GLOBULIN PLAS-MCNC: 2.6 G/DL (ref 2–3.5)
GLUCOSE BLD-MCNC: 108 MG/DL (ref 70–99)
HCT VFR BLD AUTO: 38.7 %
HGB BLD-MCNC: 12.9 G/DL
IMM GRANULOCYTES # BLD AUTO: 0.02 X10(3) UL (ref 0–1)
IMM GRANULOCYTES NFR BLD: 0.3 %
IRON SATN MFR SERPL: 14 %
IRON SERPL-MCNC: 52 UG/DL
LYMPHOCYTES # BLD AUTO: 2.09 X10(3) UL (ref 1–4)
LYMPHOCYTES NFR BLD AUTO: 29.2 %
MCH RBC QN AUTO: 32.6 PG (ref 26–34)
MCHC RBC AUTO-ENTMCNC: 33.3 G/DL (ref 31–37)
MCV RBC AUTO: 97.7 FL
MONOCYTES # BLD AUTO: 0.68 X10(3) UL (ref 0.1–1)
MONOCYTES NFR BLD AUTO: 9.5 %
NEUTROPHILS # BLD AUTO: 4.04 X10 (3) UL (ref 1.5–7.7)
NEUTROPHILS # BLD AUTO: 4.04 X10(3) UL (ref 1.5–7.7)
NEUTROPHILS NFR BLD AUTO: 56.4 %
OSMOLALITY SERPL CALC.SUM OF ELEC: 296 MOSM/KG (ref 275–295)
PLATELET # BLD AUTO: 251 10(3)UL (ref 150–450)
POTASSIUM SERPL-SCNC: 4.1 MMOL/L (ref 3.5–5.1)
PROT SERPL-MCNC: 6.8 G/DL (ref 5.7–8.2)
RBC # BLD AUTO: 3.96 X10(6)UL
SODIUM SERPL-SCNC: 141 MMOL/L (ref 136–145)
TOTAL IRON BINDING CAPACITY: 385 UG/DL (ref 250–425)
TRANSFERRIN SERPL-MCNC: 293 MG/DL (ref 215–365)
VIT B12 SERPL-MCNC: 448 PG/ML (ref 211–911)
WBC # BLD AUTO: 7.2 X10(3) UL (ref 4–11)

## 2025-01-13 PROCEDURE — 82728 ASSAY OF FERRITIN: CPT

## 2025-01-13 PROCEDURE — 36415 COLL VENOUS BLD VENIPUNCTURE: CPT

## 2025-01-13 PROCEDURE — 80053 COMPREHEN METABOLIC PANEL: CPT

## 2025-01-13 PROCEDURE — 85025 COMPLETE CBC W/AUTO DIFF WBC: CPT

## 2025-01-13 PROCEDURE — 82746 ASSAY OF FOLIC ACID SERUM: CPT

## 2025-01-13 PROCEDURE — 82607 VITAMIN B-12: CPT

## 2025-01-13 PROCEDURE — 83550 IRON BINDING TEST: CPT

## 2025-01-13 PROCEDURE — 83540 ASSAY OF IRON: CPT

## 2025-01-21 ENCOUNTER — OFFICE VISIT (OUTPATIENT)
Dept: INTERNAL MEDICINE CLINIC | Facility: CLINIC | Age: OVER 89
End: 2025-01-21
Payer: MEDICARE

## 2025-01-21 VITALS
SYSTOLIC BLOOD PRESSURE: 110 MMHG | TEMPERATURE: 97 F | DIASTOLIC BLOOD PRESSURE: 76 MMHG | OXYGEN SATURATION: 97 % | BODY MASS INDEX: 32.39 KG/M2 | HEART RATE: 98 BPM | HEIGHT: 67 IN | WEIGHT: 206.38 LBS

## 2025-01-21 DIAGNOSIS — H26.9 CATARACT, UNSPECIFIED CATARACT TYPE, UNSPECIFIED LATERALITY: ICD-10-CM

## 2025-01-21 DIAGNOSIS — H40.9 GLAUCOMA, UNSPECIFIED GLAUCOMA TYPE, UNSPECIFIED LATERALITY: ICD-10-CM

## 2025-01-21 DIAGNOSIS — L57.0 AK (ACTINIC KERATOSIS): ICD-10-CM

## 2025-01-21 DIAGNOSIS — I42.0 DILATED CARDIOMYOPATHY (HCC): ICD-10-CM

## 2025-01-21 DIAGNOSIS — I48.91 ATRIAL FIBRILLATION STATUS POST CARDIOVERSION (HCC): ICD-10-CM

## 2025-01-21 DIAGNOSIS — I10 BENIGN ESSENTIAL HYPERTENSION: ICD-10-CM

## 2025-01-21 DIAGNOSIS — J43.8 PARASEPTAL EMPHYSEMA (HCC): ICD-10-CM

## 2025-01-21 DIAGNOSIS — I65.23 BILATERAL CAROTID ARTERY STENOSIS: ICD-10-CM

## 2025-01-21 DIAGNOSIS — I51.89 COMBINED SYSTOLIC AND DIASTOLIC CARDIAC DYSFUNCTION: ICD-10-CM

## 2025-01-21 DIAGNOSIS — K59.00 CONSTIPATION, UNSPECIFIED CONSTIPATION TYPE: ICD-10-CM

## 2025-01-21 DIAGNOSIS — Z51.81 MEDICATION MONITORING ENCOUNTER: ICD-10-CM

## 2025-01-21 DIAGNOSIS — I77.810 DILATATION OF THORACIC AORTA: ICD-10-CM

## 2025-01-21 DIAGNOSIS — D50.9 IRON DEFICIENCY ANEMIA, UNSPECIFIED IRON DEFICIENCY ANEMIA TYPE: ICD-10-CM

## 2025-01-21 DIAGNOSIS — Z00.00 ENCOUNTER FOR ANNUAL WELLNESS VISIT (AWV) IN MEDICARE PATIENT: Primary | ICD-10-CM

## 2025-01-21 DIAGNOSIS — E03.8 SUBCLINICAL HYPOTHYROIDISM: ICD-10-CM

## 2025-01-21 PROCEDURE — G0439 PPPS, SUBSEQ VISIT: HCPCS | Performed by: INTERNAL MEDICINE

## 2025-01-21 PROCEDURE — 99499 UNLISTED E&M SERVICE: CPT | Performed by: INTERNAL MEDICINE

## 2025-01-21 NOTE — PROGRESS NOTES
Subjective:   Bernardino Palacios is a 89 year old male who presents for a Medicare Subsequent Annual Wellness visit (Pt already had Initial Annual Wellness) and the following:     Afib s/p cardioversion/dilation of thoracic aorta/dilated cardiomyopathy/systolic and diastolic cardiac dysfunction/BL carotid stenosis- follows with cardiology. He is on eliquis, amiodarone and diuretics. Due to have follow-up labs and see cardiology again in 4/2025.    Paraseptal emphysema- controlled. No breathing symptoms.      HTN-controlled.     Glaucoma/cataracts- follows with ophthalmology-      Iron def anemia- has contact info for GI. States he will make appt.   Denies rectal bleeding or hematuria.     Subclinical hypothyroidism- monitoring. Likely related to amiodarone. not symptomatic outside of mild constipation that improves with miralax.     History/Other:   Fall Risk Assessment:   He has been screened for Falls and is low risk.      Cognitive Assessment:   Abnormal  What day of the week is this?: Correct  What month is it?: Correct  What year is it?: Correct  Recall \"Ball\": Incorrect  Recall \"Flag\": Incorrect  Recall \"Tree\": Correct      Functional Ability/Status:   Bernardino Palacios has some abnormal functions as listed below:  He has Hearing problems based on screening of functional status.He has Vision problems based on screening of functional status.       Depression Screening (PHQ):  PHQ-2 SCORE: 0  , done 1/14/2025   Last [place] Suicide Screening on 1/21/2025 was No Risk.         Advanced Directives:   He does have a Living Will but we do NOT have it on file in Epic.    He does have a POA but we do NOT have it on file in Epic.    Not discussed      Patient Active Problem List   Diagnosis    History of DVT (deep vein thrombosis)    Abnormal vision    Atrial fibrillation status post cardioversion (HCC)    Benign essential hypertension    Benign prostatic hyperplasia without urinary obstruction    Bilateral carotid artery  stenosis    Combined systolic and diastolic cardiac dysfunction    Constipation    Dilatation of thoracic aorta (HCC)    Dilated cardiomyopathy (HCC)    Familial hypercholesterolemia    Glaucoma    Paraseptal emphysema ([facility])    Encounter for monitoring amiodarone therapy    Subclinical hypothyroidism     Allergies:  He has No Known Allergies.    Current Medications:  Outpatient Medications Marked as Taking for the 1/21/25 encounter (Office Visit) with Joselyn Torres MD   Medication Sig    Polyethylene Glycol 3350 (MIRALAX OR) Take by mouth daily as needed.    docusate sodium (COLACE) 100 MG Oral Cap Take 1 capsule (100 mg total) by mouth 2 (two) times daily.    finasteride 5 MG Oral Tab Take 1 tablet (5 mg total) by mouth daily.    NON FORMULARY Take by mouth as needed. [place] Mucil PRN    tamsulosin 0.4 MG Oral Cap     metoprolol succinate ER 50 MG Oral Tablet 24 Hr Take 1 tablet (50 mg total) by mouth daily.    latanoprost 0.005 % Ophthalmic Solution Place 1 drop into both eyes nightly.    lisinopril 5 MG Oral Tab Take 1 tablet (5 mg total) by mouth daily.    amiodarone HCl 200 MG Oral Tab Take 0.5 tablets (100 mg total) by mouth daily.    apixaban 5 MG Oral Tab Take 1 tablet (5 mg total) by mouth 2 (two) times daily.    furosemide 20 MG Oral Tab Take 1 tablet (20 mg total) by mouth daily.    Brimonidine Tartrate (ALPHAGAN) 0.15 % Ophthalmic Solution Place 1 drop into both eyes 2 (two) times daily.    Dorzolamide HCl-Timolol Mal (DORZOLAMIDE-TIMOLOL) 22.3-6.8 MG/ML Ophthalmic Solution 1 drop 2 (two) times daily.    Multiple Vitamins-Minerals (MULTI-VITAMIN/MINERALS) Oral Tab Take 1 tablet by mouth daily.       Medical History:  He  has a past medical history of Acute, but ill-defined, cerebrovascular disease (TIA  March 2013), Arrhythmia, Arthritis (1994), Atherosclerosis of coronary artery (August 2019), Cancer ([facility]) (11 /2020), Elevated blood pressure, Esophageal reflux, Glaucoma, H/O blood clots, High  blood pressure, and High cholesterol.  Surgical History:  He  has a past surgical history that includes colonoscopy (); skin surgery ( plus); tonsillectomy (Child); other surgical history (Multiple); adenoidectomy; and cataract (Left).   Family History:  His family history is not on file.  Social History:  He  reports that he quit smoking about 52 years ago. His smoking use included cigarettes. He has a 30 pack-year smoking history. He has never used smokeless tobacco. He reports current alcohol use. He reports that he does not use drugs.    Tobacco:  He smoked tobacco in the past but quit greater than 12 months ago.  Social History     Tobacco Use   Smoking Status Former    Current packs/day: 0.00    Average packs/day: 1.5 packs/day for 20.0 years (30.0 ttl pk-yrs)    Types: Cigarettes    Quit date: 1973    Years since quittin.0   Smokeless Tobacco Never   Tobacco Comments    Cigarettes, pipe, cigars        CAGE Alcohol Screen:   CAGE screening score of 0 on 2025, showing low risk of alcohol abuse.      Patient Care Team:  Joselyn Torres MD as PCP - General (Internal Medicine)    Review of Systems  As in HPI     Objective:   Physical Exam  PHYSICAL EXAM:   General: no acute distress   Eyes: pupils equal and reactive; EOMI; sclera normal; conjunctiva normal   ENT:  without erythema or exudate  Neck: trachea midline; no adenopathy; thyroid not enlarged   Hematologic/lymphatic:no cervical lymphadenopathy; no supraclavicular adenopathy   Respiratory: no increased work of breathing; good air exchange; CTAB; no crackles or wheezing   Cardiovascular: S1, S2; no murmurs; no carotid bruits; no edema   Gastrointestinal: normal bowel sounds; soft; non-distended; non-tender  Neurological: awake, alert, oriented x3; CNII-XII grossly intact;  MSK: slowed range of motion. Uses cane.   Behavioral/Psych: euthymic; appropriate affect   Skin: AK-follows with derm  /76 (BP Location: Left arm, Patient  Position: Sitting, Cuff Size: large)   Pulse 98   Temp 97.2 °F (36.2 °C) (Temporal)   Ht 5' 7\" (1.702 m)   Wt 206 lb 6.4 oz (93.6 kg)   SpO2 97%   BMI 32.33 kg/m²  Estimated body mass index is 32.33 kg/m² as calculated from the following:    Height as of this encounter: 5' 7\" (1.702 m).    Weight as of this encounter: 206 lb 6.4 oz (93.6 kg).    Medicare Hearing Assessment:   Hearing Screening    Time taken: 1/21/2025  8:02 AM  Entry User: Gin Watson MA  Screening Method: Finger Rub  Finger Rub Result: Pass               Assessment & Plan:   Bernardino Palacios is a 89 year old male who presents for a Medicare Assessment.     Bernardino was seen today for wellness visit.    Diagnoses and all orders for this visit:    Encounter for annual wellness visit (AWV) in Medicare patient  -as above     Atrial fibrillation status post cardioversion (HCC)  Dilated cardiomyopathy (HCC)  Dilatation of thoracic aorta (HCC)  Combined systolic and diastolic cardiac dysfunction  Bilateral carotid artery stenosis  Benign essential hypertension  -     TSH and Free T4 [E]; Future  -     CBC With Differential With Platelet; Future  -     Comp Metabolic Panel (14); Future  -     Lipid Panel; Future  -     Iron And Tibc; Future  -     B12 AND FOLATE; Future  -medication as per cardiology. Has follow-up with both EP and general cardiology     Paraseptal emphysema (HCC)  -controlled. Continue supportive care     Cataract, unspecified cataract type, unspecified laterality  Glaucoma, unspecified glaucoma type, unspecified laterality  -following with ophthalmology     Iron deficiency anemia, unspecified iron deficiency anemia type  -     TSH and Free T4 [E]; Future  -     CBC With Differential With Platelet; Future  -     Comp Metabolic Panel (14); Future  -     Lipid Panel; Future  -     Iron And Tibc; Future  -     B12 AND FOLATE; Future  -iron supplements/diet. BM regimen to avoid constipation  -make appt with GI. Discussed when to seek  urgent medical attention; voiced understanding     Subclinical hypothyroidism-could be 2/2 amiodarone   -     TSH and Free T4 [E]; Future  -     CBC With Differential With Platelet; Future  -     Comp Metabolic Panel (14); Future  -     Lipid Panel; Future  -     Iron And Tibc; Future  -     B12 AND FOLATE; Future        Medication monitoring encounter  -     TSH and Free T4 [E]; Future    Constipation, unspecified constipation type  -miralax    AK (actinic keratosis)  -follow-up with derm      The patient indicates understanding of these issues and agrees to the plan.  Reinforced healthy diet, lifestyle, and exercise.      Return in about 6 months (around 7/21/2025).     Joselyn Shepherd MD, 1/21/2025     Supplementary Documentation:   General Health:  In the past six months, have you lost more than 10 pounds without trying?: (Patient-Rptd) 2 - No  Has your appetite been poor?: (Patient-Rptd) No  Type of Diet: (Patient-Rptd) Balanced  How does the patient maintain a good energy level?: (Patient-Rptd) Appropriate Exercise  How would you describe your daily physical activity?: (Patient-Rptd) Light  How would you describe your current health state?: (Patient-Rptd) Good  How do you maintain positive mental well-being?: (Patient-Rptd) Social Interaction;Visiting Family  On a scale of 0 to 10, with 0 being no pain and 10 being severe pain, what is your pain level?: (Patient-Rptd) 1 - (Mild)  In the past six months, have you experienced urine leakage?: (Patient-Rptd) 1-Yes  At any time do you feel concerned for the safety/well-being of yourself and/or your children, in your home or elsewhere?: (Patient-Rptd) No  Have you had any immunizations at another office such as Influenza, Hepatitis B, Tetanus, or Pneumococcal?: (Patient-Rptd) No

## 2025-01-23 PROBLEM — I10 HYPERTENSION: Status: ACTIVE | Noted: 2021-09-08

## 2025-01-23 PROBLEM — E78.5 DYSLIPIDEMIA: Status: ACTIVE | Noted: 2021-09-08

## 2025-01-23 PROBLEM — G45.9 TRANSIENT ISCHEMIC ATTACK: Status: ACTIVE | Noted: 2021-09-08

## 2025-01-23 PROBLEM — R60.0 EDEMA, PERIPHERAL: Status: ACTIVE | Noted: 2022-04-04

## 2025-01-23 PROBLEM — R91.8 PULMONARY NODULES: Status: ACTIVE | Noted: 2019-08-15

## 2025-01-23 PROBLEM — R53.83 FATIGUE: Status: ACTIVE | Noted: 2021-10-29

## 2025-01-29 RX ORDER — AMIODARONE HYDROCHLORIDE 200 MG/1
200 TABLET ORAL DAILY
COMMUNITY

## 2025-01-29 RX ORDER — MAGNESIUM OXIDE 400 MG (241.3 MG MAGNESIUM) TABLET
1 TABLET DAILY
COMMUNITY

## 2025-01-29 NOTE — PAT NURSING NOTE
Spoke with pt regarding PAT call. He confirmed he has not missed any Eliquis doses in the last 30 days.    PreOp Instructions     You are scheduled for: a Cardiac Procedure     Date of Procedure: 02/05/25     Diet Instructions: Do not eat or drink anything after midnight including gum, mints, candy, etc the night before your procedure.     Medications: Medications you are allowed to take can be taken with a sip of water the morning of your procedure. DO NOT MISS any Eliquis doses. You can take your medications that are not listed below.     Medications to Stop: DO NOT TAKE any herbal supplements and vitamins the morning of your procedure.     Other Medications: DO NOT TAKE Furosemide the morning of your procedure.     Arrival Time: The day prior to your procedure you will receive a phone call between 3:00 pm - 6:00 pm with your arrival time. If you haven't received a phone call, please check your voicemail messages., If you did not receive a voice mail and it is after 6:00 pm, please call the nursing supervisor at [phone].    Driving After Procedure: Sedation will be given so you WILL NOT be able to drive home. You will need a responsible adult  to drive you home. You can NOT take uber or taxi unless approved by your physician in advance.     Discharge Teaching: Your nurse will give you specific instructions before discharge, Any questions, please call the physician's office

## 2025-02-05 ENCOUNTER — HOSPITAL ENCOUNTER (OUTPATIENT)
Dept: INTERVENTIONAL RADIOLOGY/VASCULAR | Facility: HOSPITAL | Age: OVER 89
Discharge: HOME | End: 2025-02-05
Attending: INTERNAL MEDICINE | Admitting: INTERNAL MEDICINE
Payer: MEDICARE

## 2025-02-05 VITALS
HEART RATE: 79 BPM | DIASTOLIC BLOOD PRESSURE: 105 MMHG | HEIGHT: 67 IN | BODY MASS INDEX: 32.33 KG/M2 | OXYGEN SATURATION: 98 % | RESPIRATION RATE: 14 BRPM | TEMPERATURE: 98 F | WEIGHT: 206 LBS | SYSTOLIC BLOOD PRESSURE: 180 MMHG

## 2025-02-05 DIAGNOSIS — I48.91 A-FIB (HCC): ICD-10-CM

## 2025-02-05 RX ORDER — METHOHEXITAL IN WATER/PF 100MG/10ML
100 SYRINGE (ML) INTRAVENOUS ONCE
Status: DISCONTINUED | OUTPATIENT
Start: 2025-02-05 | End: 2025-02-05 | Stop reason: HOSPADM

## 2025-02-05 RX ORDER — SODIUM CHLORIDE 9 MG/ML
INJECTION, SOLUTION INTRAVENOUS CONTINUOUS
Status: DISCONTINUED | OUTPATIENT
Start: 2025-02-05 | End: 2025-02-05

## 2025-02-05 RX ORDER — METHOHEXITAL IN WATER/PF 100MG/10ML
SYRINGE (ML) INTRAVENOUS
Status: DISCONTINUED
Start: 2025-02-05 | End: 2025-02-05 | Stop reason: WASHOUT

## 2025-02-05 NOTE — H&P
[place]  Pre Procedure History and Physical    Bernardino Palacios Patient Status:  Outpatient    1934 MRN [id number]   Location [facility] INTERVENTIONAL SUITES Attending Aura Logan MD   Hosp Day # 0 PCP Joselyn Shepherd MD     Consults      History of Present Illness:  Bernardino Palacios is a a(n) 89 year old male here for DCCV      Prior H/P Reviewed with pt reporting missing a dose of OAC last week    History:  Past Medical History:    Acute, but ill-defined, cerebrovascular disease    Anemia    Arrhythmia    Arthritis    Atherosclerosis of coronary artery    Atypical mole    Back pain    Lower back    Bad breath    Belching    Black stools    When taking iron supplements    Cancer (HCC)    Basal cell carcinoma    Cardiomyopathy (HCC)    Change in hair    Chronic cough    Constipation    Past 6 months    Deep vein thrombosis (HCC)    Disorder of prostate    Easy bruising    Taking Eliquis    Elevated blood pressure    Esophageal reflux    Occasional    Frequent urination    Frequent use of laxatives    Glaucoma    H/O blood clots    Hearing loss    Heart palpitations    Heartburn    Occasional    Hemorrhoids    High blood pressure    High cholesterol    Irregular bowel habits    Past 6 months    Jaundice    Pacemaker    Pain in joints    Pain with bowel movements    Occasional    Presence of other cardiac implants and grafts    Cataract left eye removed    Sleep disturbance    Thyroid disease    Amiodarone    Wears glasses     Past Surgical History:   Procedure Laterality Date    Adenoidectomy      Early childhood    Cardiac pacemaker placement  2022    Cataract Left     Colonoscopy      Other surgical history  Multiple    Remove many lipomas    Sigmoidoscopy,diagnostic      Skin surgery   plus    Tonsillectomy  Child     History reviewed. No pertinent family history.   reports that he quit smoking about 52 years ago. His smoking use included cigarettes. He has a 30  pack-year smoking history. He has never used smokeless tobacco. He reports current alcohol use. He reports that he does not use drugs.    Allergies:  Allergies[1]    Medications:    Current Facility-Administered Medications:     sodium chloride 0.9% infusion, , Intravenous, Continuous    OBJECTIVE      Physical Exam:  Physical Exam   Height 5' 7\" (1.702 m), weight 206 lb (93.4 kg).  No data recorded.    Wt Readings from Last 3 Encounters:   01/29/25 206 lb (93.4 kg)   01/31/25 198 lb (89.8 kg)   01/23/25 206 lb (93.4 kg)       NAD  PERRLA/EOMI  Neck veins not elevated  Carotids- no bruits  CTA bilaterally  Cardiac- irreg irreg  Abdomen- Soft,Nontender, normal BS  Extremities- pulses normal  Edema- none  Mood /Affect Congruent  Skin- no lesions          Results:   No results for input(s): \"GLU\", \"BUN\", \"CREATSERUM\", \"GFRAA\", \"GFRNAA\", \"EGFRCR\", \"CA\", \"NA\", \"K\", \"CL\", \"CO2\" in the last 168 hours.  No results for input(s): \"RBC\", \"HGB\", \"HCT\", \"MCV\", \"MCH\", \"MCHC\", \"RDW\", \"NEPRELIM\", \"WBC\", \"PLT\" in the last 168 hours.      [unfilled]  No results for input(s): \"BNP\" in the last 168 hours.  No results found for: \"PT\", \"INR\"  No results found for: \"TROP\"      No results found.       Assessment and Plan    Patient Active Problem List   Diagnosis    History of DVT (deep vein thrombosis)    Abnormal vision    Atrial fibrillation status post cardioversion (HCC)    Benign essential hypertension    Benign prostatic hyperplasia without urinary obstruction    Bilateral carotid artery stenosis    Combined systolic and diastolic cardiac dysfunction    Constipation    Dilatation of thoracic aorta    Dilated cardiomyopathy (HCC)    Familial hypercholesterolemia    Glaucoma    Paraseptal emphysema ([facility])    Encounter for monitoring amiodarone therapy    Subclinical hypothyroidism    Dyslipidemia    Edema, peripheral    Fatigue    Hypertension    Pulmonary nodules    Transient ischemic attack       Consent: Risks, Benefits and  alternatives discussed in clinic. Reverified on the day of the procedure    Procedure cancelled      Aura Logan MD  Cardiac Electrophysiology  [facility]  2/5/2025  9:11 AM         [1] No Known Allergies

## 2025-02-05 NOTE — DISCHARGE INSTRUCTIONS
HOME CARE INSTRUCTIONS FOLLOWING CARDIOVERSION OR ICD EVALUATION    ACTIVITY:  - DO NOT drive after the procedure. You may resume driving after 24 hours.   - DO NOT operate any heavy machinery for 24 hours (Including kitchen appliances or power tools)   - Avoid drinking alcohol for 24 hours.  - Rest today and resume your normal activities tomorrow.    WHAT IS NORMAL:   -Your skin may be red where the patches were placed.  -The redness may last 2-3 days and feel like \"Sunburn\"  -You may treat the area with an over-the- counter cream that would be used for sunburned skin.    SPECIAL INSTRUCTIONS:  -IF you are taking the medication Eliquis or Coumadin or other blood thinning medication, it is very important that you continue taking until your follow up appointment with you physician.    WHEN YOU SHOULD NOTIFY YOUR PHYSICIAN:   -If you feel that you have returned to an irregular rhythm    OTHER:  -You may resume your present diet and medications unless otherwise specified by you physician.  -A list of medications was provided to you at discharge.  -Please call your physicians office for a follow-up appointment. You should be seen in 2 weeks unless otherwise specified.

## 2025-02-05 NOTE — IVS NOTE
Patient reports missed dose of Eliquis last week. Dr. Logan notified. Procedure canceled. PIV removed. Discharged home with all belongings.

## 2025-02-28 ENCOUNTER — APPOINTMENT (OUTPATIENT)
Dept: CT IMAGING | Age: OVER 89
End: 2025-02-28
Attending: NURSE PRACTITIONER
Payer: MEDICARE

## 2025-02-28 ENCOUNTER — HOSPITAL ENCOUNTER (OUTPATIENT)
Age: OVER 89
Discharge: HOME | End: 2025-02-28
Payer: MEDICARE

## 2025-02-28 ENCOUNTER — APPOINTMENT (OUTPATIENT)
Dept: GENERAL RADIOLOGY | Age: OVER 89
End: 2025-02-28
Attending: NURSE PRACTITIONER
Payer: MEDICARE

## 2025-02-28 VITALS
OXYGEN SATURATION: 100 % | SYSTOLIC BLOOD PRESSURE: 103 MMHG | HEART RATE: 80 BPM | RESPIRATION RATE: 16 BRPM | TEMPERATURE: 99 F | DIASTOLIC BLOOD PRESSURE: 87 MMHG

## 2025-02-28 DIAGNOSIS — S42.294A OTHER CLOSED NONDISPLACED FRACTURE OF PROXIMAL END OF RIGHT HUMERUS, INITIAL ENCOUNTER: Primary | ICD-10-CM

## 2025-02-28 DIAGNOSIS — S09.90XA CLOSED HEAD INJURY, INITIAL ENCOUNTER: ICD-10-CM

## 2025-02-28 PROCEDURE — 70450 CT HEAD/BRAIN W/O DYE: CPT | Performed by: NURSE PRACTITIONER

## 2025-02-28 PROCEDURE — 99214 OFFICE O/P EST MOD 30 MIN: CPT

## 2025-02-28 PROCEDURE — 73060 X-RAY EXAM OF HUMERUS: CPT | Performed by: NURSE PRACTITIONER

## 2025-02-28 PROCEDURE — 99204 OFFICE O/P NEW MOD 45 MIN: CPT

## 2025-02-28 PROCEDURE — 73030 X-RAY EXAM OF SHOULDER: CPT | Performed by: NURSE PRACTITIONER

## 2025-02-28 RX ORDER — HYDROCODONE BITARTRATE AND ACETAMINOPHEN 5; 325 MG/1; MG/1
1-2 TABLET ORAL EVERY 6 HOURS PRN
Qty: 20 TABLET | Refills: 0 | Status: SHIPPED | OUTPATIENT
Start: 2025-02-28 | End: 2025-03-07

## 2025-02-28 NOTE — DISCHARGE INSTRUCTIONS
Wear sling when up and during the day, take off for nighttime.  Elevate when seated, and apply ice every 2-3 hours for 20 minutes.   Take recommended pain medications as instructed.  Over-the-counter ibuprofen or Tylenol  Follow-up with orthopedic.   Return immediately for loss of motor or sensory function to the affected extremity, severe pain, or any new condition

## 2025-02-28 NOTE — ED PROVIDER NOTES
Patient Seen in: [facility]      History   No chief complaint on file.    Stated Complaint: Shoulder Pain; Inj After Fall    Subjective:   89-year-old male presents for right shoulder pain and bruising.  Patient is on Eliquis states 2 days ago he slipped going down the stairs landed on his buttock and slid down the stairs does state he hit his head on the side of the wall along with his shoulder.  He was concerned with increased pain with movements        Objective:     No pertinent past medical history.            No pertinent past surgical history.              No pertinent social history.            Review of Systems   Constitutional: Negative.    Respiratory: Negative.     Cardiovascular: Negative.    Gastrointestinal: Negative.    Skin: Negative.        Positive for stated complaint: Shoulder Pain; Inj After Fall  Other systems are as noted in HPI.  Constitutional and vital signs reviewed.      All other systems reviewed and negative except as noted above.    Physical Exam     ED Triage Vitals [02/28/25 0900]   /87   Pulse 80   Resp 16   Temp 98.5 °F (36.9 °C)   Temp src Oral   SpO2 100 %   O2 Device None (Room air)       Current Vitals:   Vital Signs  BP: 103/87  Pulse: 80  Resp: 16  Temp: 98.5 °F (36.9 °C)  Temp src: Oral    Oxygen Therapy  SpO2: 100 %  O2 Device: None (Room air)        Physical Exam  Vitals and nursing note reviewed.   Constitutional:       General: He is not in acute distress.  HENT:      Head: Normocephalic.   Cardiovascular:      Rate and Rhythm: Normal rate.   Pulmonary:      Effort: Pulmonary effort is normal.   Musculoskeletal:         General: Normal range of motion.   Skin:     General: Skin is warm and dry.          Neurological:      General: No focal deficit present.      Mental Status: He is alert and oriented to person, place, and time.           ED Course   Labs Reviewed - No data to display  CT BRAIN OR HEAD (CPT=70450)    Result Date:  2/28/2025  PROCEDURE:  CT BRAIN OR HEAD (93758)  COMPARISON:  None.  INDICATIONS:  Shoulder Pain; Inj After Fall  TECHNIQUE:  Noncontrast CT scanning is performed through the brain. Dose reduction techniques were used. Dose information is transmitted to the ACR (American College of Radiology) NRDR (National Radiology Data Registry) which includes the Dose Index Registry.  PATIENT STATED HISTORY: (As transcribed by Technologist)  Patient fell early thursday morning hitting righti side head.  No loss of consciousness.  On blood thinners. Right shoulder pain now.    FINDINGS:   VENTRICLES/SULCI:   Ventricles and sulci are prominent indicating atrophy.  INTRACRANIAL:  There are no abnormal extraaxial fluid collections.  There is no midline shift.  There are no acute appearing intraparenchymal brain abnormalities.  There is nothing specific for acute territorial infarct.  There is no hemorrhage or mass lesion.  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  This includes an old left occipital infarct.  SINUSES:  No acute sinusitis.   MASTOIDS:  The mastoids are clear.  SKULL:  No evidence for fracture or acute osseous abnormality.  OTHER:  Sebaceous cysts are demonstrated in the occipital region on the right..            CONCLUSION:  Old left occipital infarct.  No acute infarct.  Chronic changes in the brain, as described, but no acute intracranial bleed, or other acute intracranial process identified.    LOCATION:  [facility]   Dictated by (CST): Vincent Nair MD on 2/28/2025 at 10:14 AM     Finalized by (CST): Vincent Nair MD on 2/28/2025 at 10:18 AM       XR HUMERUS (MIN 2 VIEWS), RIGHT (CPT=73060)    Result Date: 2/28/2025  PROCEDURE:  XR HUMERUS (MIN 2 VIEWS), RIGHT(CPT=73060)  INDICATIONS:  Shoulder Pain; Inj After Fall  COMPARISON:  CHAVO, XR, XR SHOULDER, COMPLETE (MIN 2 VIEWS), RIGHT (CPT=73030), 2/28/2025, 9:39 AM.  PATIENT STATED HISTORY: (As transcribed by  Technologist)  Right shoulder pain. Patient fell down 14 stairs. Any rotation of arm is too painful.    FINDINGS:  BONES:  Decreased bone mineralization limits evaluation of the osseous structures.  However, there is a lucency involving the right proximal humeral shaft concerning for a hairline nondisplaced fracture.  Advanced glenohumeral joint degenerative change is noted.            CONCLUSION:  1. Findings suggest a hairline nondisplaced fracture involving the right proximal humeral shaft, correlate clinically with point of maximal tenderness.   LOCATION:  [facility]   Dictated by ([facility]): Adele Fitzgerald MD on 2/28/2025 at 10:05 AM     Finalized by (CST): Adele Fitzgerald MD on 2/28/2025 at 10:06 AM       XR SHOULDER, COMPLETE (MIN 2 VIEWS), RIGHT (CPT=73030)    Result Date: 2/28/2025  PROCEDURE:  XR SHOULDER, COMPLETE (MIN 2 VIEWS), RIGHT (CPT=73030)  TECHNIQUE:  Multiple views were obtained with the patient in a wheelchair.  COMPARISON:  MAZIN TONY, XR HUMERUS (MIN 2 VIEWS), RIGHT (CPT=73060), 2/28/2025, 9:46 AM.  INDICATIONS:  Shoulder Pain; Inj After Fall  PATIENT STATED HISTORY: (As transcribed by Technologist)  Right shoulder pain. Patient fell down 14 stairs. Any rotation of arm is too painful.    FINDINGS:  Decreased bone mineralization limits evaluation of the osseous structures.  No acute displaced fracture appreciated.  There is complete obliteration of the glenohumeral joint with flattening of the humeral head medially and adjacent hypertrophic osseous changes consistent with advanced degenerative change.  Acromioclavicular joint arthropathy is also present to a lesser extent.  No dislocation.            CONCLUSION:  1. Advanced glenohumeral joint degenerative change. 2. No acute displaced fracture appreciated.  Continued clinical correlation recommended.   LOCATION:  [facility]   Dictated by ([facility]): Adele Fitzgerald MD on 2/28/2025 at 10:03 AM     Finalized by (CST): Adele Fitzgerald MD on 2/28/2025 at 10:05 AM         MDM       Medical Decision Making  Pertinent Labs & Imaging studies reviewed. (See chart for details).  Patient coming in with shoulder pain, bruising and head injury.   Differential diagnosis includes closed head injury, shoulder fracture, dislocation, humerus fracture     Patient is comfortable with plan of care.     Overall Pt looks good. Non-toxic, well-hydrated and in no respiratory distress. Vital signs are reassuring. Exam is reassuring. I do not believe pt requires and additional diagnostic studies or intervention. I believe pt can be discharged home to continue evaluation as an outpatient. Follow-up provider given. Discharge instructions given and reviewed. Return for any problems. All understand and agrees with the plan.            Disposition and Plan     Clinical Impression:  1. Other closed nondisplaced fracture of proximal end of right humerus, initial encounter    2. Closed head injury, initial encounter         Disposition:  Discharge  2/28/2025 10:42 am    Follow-up:  Joselyn Torres MD  [address] [facility] [address]  [facility] [address]  [phone]          Katlyn Marrufo, PA  [facility] [place] [id number] [phone]                Medications Prescribed:  Discharge Medication List as of 2/28/2025 10:44 AM        START taking these medications    Details   HYDROcodone-acetaminophen 5-325 MG Oral Tab Take 1-2 tablets by mouth every 6 (six) hours as needed for Pain., Normal, Disp-20 tablet, R-0                 Supplementary Documentation:

## 2025-03-11 NOTE — PAT NURSING NOTE
PAT call with patient. The following instructions were given and sent through his My Chart. Questions answered and he verbalized understanding.     PreOp Instructions    You are scheduled for: a Cardiac Procedure    Date of Procedure: 03/19/25    Diet Instructions: Do not eat or drink anything after midnight including gum, mints, candy, etc.    Medications: Medications you are allowed to take can be taken with a sip of water the morning of your procedure    Medications to Stop: Hold herbal supplements and vitamins    Other Medications: Hold your Furosemide the morning of the procedue. You can take it when you get home.    Call Dr Logan's office with any missed doses of Eliquis to get further guidance.    Skin Prep : Shower soap using a clean washcloth, prior to procedure. Once dried off, no lotions/powders/creams/ointments, etc.    Arrival Time: The day prior to your procedure you will receive a phone call before 6:00 pm with your arrival time. If you haven't received a phone call, please check your voicemail messages., If you did not receive a voice mail and it is after 6:00 pm, please call the nursing supervisor at [phone].    Driving After Procedure: Sedation will be given so you WILL NOT be able to drive home. You will need a responsible adult  to drive you home. You can NOT take uber or taxi unless approved by your physician in advance.    Discharge Teaching: Your nurse will give you specific instructions before discharge, Any questions, please call the physician's office       parking is available starting at 6 am or park in the [place] parking garage at [facility]. Check in at the [facility] reception desk. Our  will be there to check you in for your procedure. Please bring your insurance cards and ID with you.

## 2025-03-19 ENCOUNTER — HOSPITAL ENCOUNTER (OUTPATIENT)
Dept: INTERVENTIONAL RADIOLOGY/VASCULAR | Facility: HOSPITAL | Age: OVER 89
Discharge: HOME | End: 2025-03-19
Attending: INTERNAL MEDICINE | Admitting: INTERNAL MEDICINE
Payer: MEDICARE

## 2025-03-19 VITALS
HEIGHT: 70 IN | SYSTOLIC BLOOD PRESSURE: 103 MMHG | TEMPERATURE: 97 F | BODY MASS INDEX: 28.35 KG/M2 | OXYGEN SATURATION: 96 % | WEIGHT: 198 LBS | RESPIRATION RATE: 26 BRPM | HEART RATE: 60 BPM | DIASTOLIC BLOOD PRESSURE: 61 MMHG

## 2025-03-19 DIAGNOSIS — I48.91 A-FIB (HCC): ICD-10-CM

## 2025-03-19 LAB
ATRIAL RATE: 61 BPM
P AXIS: 57 DEGREES
P-R INTERVAL: 268 MS
Q-T INTERVAL: 464 MS
QRS DURATION: 120 MS
QTC CALCULATION (BEZET): 467 MS
R AXIS: -58 DEGREES
T AXIS: -50 DEGREES
VENTRICULAR RATE: 61 BPM

## 2025-03-19 PROCEDURE — 93005 ELECTROCARDIOGRAM TRACING: CPT

## 2025-03-19 PROCEDURE — 99152 MOD SED SAME PHYS/QHP 5/>YRS: CPT | Performed by: INTERNAL MEDICINE

## 2025-03-19 PROCEDURE — 92960 CARDIOVERSION ELECTRIC EXT: CPT | Performed by: INTERNAL MEDICINE

## 2025-03-19 PROCEDURE — 93010 ELECTROCARDIOGRAM REPORT: CPT | Performed by: INTERNAL MEDICINE

## 2025-03-19 PROCEDURE — 5A2204Z RESTORATION OF CARDIAC RHYTHM, SINGLE: ICD-10-PCS | Performed by: INTERNAL MEDICINE

## 2025-03-19 RX ORDER — METHOHEXITAL IN WATER/PF 100MG/10ML
100 SYRINGE (ML) INTRAVENOUS ONCE
Status: DISCONTINUED | OUTPATIENT
Start: 2025-03-19 | End: 2025-03-19 | Stop reason: HOSPADM

## 2025-03-19 RX ORDER — METHOHEXITAL IN WATER/PF 100MG/10ML
SYRINGE (ML) INTRAVENOUS
Status: COMPLETED
Start: 2025-03-19 | End: 2025-03-19

## 2025-03-19 RX ORDER — SODIUM CHLORIDE 9 MG/ML
INJECTION, SOLUTION INTRAVENOUS CONTINUOUS
Status: DISCONTINUED | OUTPATIENT
Start: 2025-03-19 | End: 2025-03-20

## 2025-03-19 RX ADMIN — SODIUM CHLORIDE: 9 INJECTION, SOLUTION INTRAVENOUS at 11:30:00

## 2025-03-19 RX ADMIN — METHOHEXITAL IN WATER/PF 40 MG: 100MG/10ML SYRINGE (ML) INTRAVENOUS at 11:47:00

## 2025-03-19 NOTE — H&P
[facility]  Pre Procedure History and Physical    Bernardino Palacios Patient Status:  Outpatient    1934 MRN [id number]   Location [facility] INTERVENTIONAL SUITES Attending Aura Logan MD   Hosp Day # 0 PCP Joselyn Shepherd MD     Consults      History of Present Illness:  Bernardino Palacios is a a(n) 89 year old male here for DCCV      Prior H/P Reviewed with no changes    History:  Past Medical History:    Acute, but ill-defined, cerebrovascular disease    Anemia    Arrhythmia    Arthritis    Atherosclerosis of coronary artery    Atypical mole    Back pain    Lower back    Bad breath    Belching    Black stools    When taking iron supplements    Cancer (HCC)    Basal cell carcinoma    Cardiomyopathy (HCC)    Change in hair    Chronic cough    Constipation    Past 6 months    Deep vein thrombosis (HCC)    Disorder of prostate    Easy bruising    Taking Eliquis    Elevated blood pressure    Esophageal reflux    Occasional    Frequent urination    Frequent use of laxatives    Glaucoma    H/O blood clots    Hearing loss    Heart palpitations    Heartburn    Occasional    Hemorrhoids    High blood pressure    High cholesterol    Irregular bowel habits    Past 6 months    Jaundice    Pacemaker    Pain in joints    Pain with bowel movements    Occasional    Presence of other cardiac implants and grafts    Cataract left eye removed    Sleep disturbance    Thyroid disease    Amiodarone    Wears glasses     Past Surgical History:   Procedure Laterality Date    Adenoidectomy      Early childhood    Cardiac pacemaker placement  2022    Cataract Left     Colonoscopy      Other surgical history  Multiple    Remove many lipomas    Sigmoidoscopy,diagnostic      Skin surgery   plus    Tonsillectomy  Child     No family history on file.   reports that he quit smoking about 52 years ago. His smoking use included cigarettes. He has a 30 pack-year smoking history. He has never used smokeless  tobacco. He reports current alcohol use. He reports that he does not use drugs.    Allergies:  Allergies[1]    Medications:  No current facility-administered medications for this encounter.    OBJECTIVE      Physical Exam:  Physical Exam   Height 5' 10\" (1.778 m), weight 198 lb (89.8 kg).  No data recorded.    Wt Readings from Last 3 Encounters:   03/11/25 198 lb (89.8 kg)   01/31/25 198 lb (89.8 kg)   01/29/25 206 lb (93.4 kg)       NAD  PERRLA/EOMI  Neck veins not elevated  Carotids- no bruits  CTA bilaterally  Cardiac- irreg irreg  Abdomen- Soft,Nontender, normal BS  Extremities- pulses normal  Edema- none  Mood /Affect Congruent  Skin- no lesions          Results:   No results for input(s): \"GLU\", \"BUN\", \"CREATSERUM\", \"GFRAA\", \"GFRNAA\", \"EGFRCR\", \"CA\", \"NA\", \"K\", \"CL\", \"CO2\" in the last 168 hours.  No results for input(s): \"RBC\", \"HGB\", \"HCT\", \"MCV\", \"MCH\", \"MCHC\", \"RDW\", \"NEPRELIM\", \"WBC\", \"PLT\" in the last 168 hours.      [unfilled]  No results for input(s): \"BNP\" in the last 168 hours.  No results found for: \"PT\", \"INR\"  No results found for: \"TROP\"      No results found.       Assessment and Plan    Patient Active Problem List   Diagnosis    History of DVT (deep vein thrombosis)    Abnormal vision    Atrial fibrillation status post cardioversion (HCC)    Benign essential hypertension    Benign prostatic hyperplasia without urinary obstruction    Bilateral carotid artery stenosis    Combined systolic and diastolic cardiac dysfunction    Constipation    Dilatation of thoracic aorta    Dilated cardiomyopathy (HCC)    Familial hypercholesterolemia    Glaucoma    Paraseptal emphysema (HCC)    Encounter for monitoring amiodarone therapy    Subclinical hypothyroidism    Dyslipidemia    Edema, peripheral    Fatigue    Hypertension    Pulmonary nodules    Transient ischemic attack       Consent: Risks, Benefits and alternatives discussed in clinic. Reverified on the day of the procedure    Procedure Planned:  DCCV    Sedation Plan: brevital    Discharge Plan: Same day      Aura Logan MD  Cardiac Electrophysiology  [facility]  3/19/2025  11:28 AM         [1] No Known Allergies

## 2025-03-19 NOTE — DISCHARGE INSTRUCTIONS
HOME CARE INSTRUCTIONS FOLLOWING CARDIOVERSION OR ICD EVALUATION    ACTIVITY:  - DO NOT drive after the procedure. You may resume driving after 24 hours.   - DO NOT operate any heavy machinery for 24 hours (Including kitchen appliances or power tools)   - Avoid drinking alcohol for 24 hours.  - Rest today and resume your normal activities tomorrow.    WHAT IS NORMAL:   -Your skin may be red where the patches were placed.  -The redness may last 2-3 days and feel like \"Sunburn\"  -You may treat the area with an over-the- counter cream that would be used for sunburned skin.    SPECIAL INSTRUCTIONS:  -IF you are taking the medication Pradaxa or Coumadin or other blood thinning medication, it is very important that you continue taking until your follow up appointment with you physician.    WHEN YOU SHOULD NOTIFY YOUR PHYSICIAN:   If you have an ICD:  -Any time your ICD device fires.  -If you feel that you have returned to an irregular rhythm    OTHER:  -You may resume your present diet and medications unless otherwise specified by you physician.  -A list of medications was provided to you at discharge.

## 2025-03-19 NOTE — PROCEDURES
Electrophysiology Procedure Note    Bernardino Palacios Location: Cath Lab   CSN [id number] MRN [id number]   Admission Date 3/19/2025  Operation Date 03/19/25    Attending Physician Aura Logan MD Operating Physician Aura Logan MD     Pre-Operative Diagnosis:Atrial Fibrillation    Post-Operative Diagnosis: Same as above  PROCEDURE(S) PERFORMED:    1.    Cardioversion.  2.     Sedation      :  Aura Logan MD     ANESTHESIA:  IV sedation.  Moderate conscious sedation for this procedure was administered and personally monitored. Brevital 40 mg  Sedation time: 10 minutes     INDICATION:  Persistent Atrial Fibrillation     COMPLICATIONS:  None.     METHODS:  The patient was brought to the outpatient cardiac telemetry unit in a fasting and nonsedated state after providing informed consent.  IV sedation was administered during continuous ECG, pulse oximeter and noninvasive hemodynamic monitoring.  After administering IV Brevital in intermittent boluses, the desired level of sedation was achieved.      Cardioversion Energy:  200J    Pad Position: Anterior-Posterior  Pre- Cardioversion Rhythm- Afib  Post Cardioversion Rhythm - NSR/A Paced    Post DCCV device check- normal    CONCLUSIONS:  1.    Successful cardioversion       Plan:  1- Rhythm/Rate Control Meds:  no changes  2) Anticoagulation- no changes  3) Follow Up- 1 month                    Aura Logan MD     Cardiac Electrophysiololgy  [facility]     \

## 2025-03-19 NOTE — PROCEDURES
Successful cardioversion at bedside with Dr Logan. Pt converted to NSR/A pacing confirmed by EKG and medtronic interrogator. Pt tolerated procedure well. See bedside sedation record. Pt awake. KAN well. Neuro intact. Pt tolerating po intake well. Pts family at bedside.     13:00: Bedrest completed. Pt ambulated and tolerated well. Voided. IV removed with catheter intact. Reviewed discharge instructions with pt and wife, verbalizes understanding. Home via w/c in stable condition without c/o. Pts daughter is the .

## 2025-04-22 ENCOUNTER — LAB ENCOUNTER (OUTPATIENT)
Dept: LAB | Age: OVER 89
End: 2025-04-22
Attending: INTERNAL MEDICINE
Payer: MEDICARE

## 2025-04-22 DIAGNOSIS — I10 BENIGN ESSENTIAL HYPERTENSION: ICD-10-CM

## 2025-04-22 DIAGNOSIS — I48.91 ATRIAL FIBRILLATION STATUS POST CARDIOVERSION (HCC): ICD-10-CM

## 2025-04-22 DIAGNOSIS — I42.0 DILATED CARDIOMYOPATHY (HCC): ICD-10-CM

## 2025-04-22 DIAGNOSIS — E03.8 SUBCLINICAL HYPOTHYROIDISM: ICD-10-CM

## 2025-04-22 DIAGNOSIS — Z51.81 MEDICATION MONITORING ENCOUNTER: ICD-10-CM

## 2025-04-22 DIAGNOSIS — D50.9 IRON DEFICIENCY ANEMIA, UNSPECIFIED IRON DEFICIENCY ANEMIA TYPE: ICD-10-CM

## 2025-04-22 LAB
ALBUMIN SERPL-MCNC: 4.3 G/DL (ref 3.2–4.8)
ALBUMIN/GLOB SERPL: 1.7 {RATIO} (ref 1–2)
ALP LIVER SERPL-CCNC: 116 U/L (ref 45–117)
ALT SERPL-CCNC: 10 U/L (ref 10–49)
ANION GAP SERPL CALC-SCNC: 6 MMOL/L (ref 0–18)
AST SERPL-CCNC: 18 U/L (ref ?–34)
BASOPHILS # BLD AUTO: 0.03 X10(3) UL (ref 0–0.2)
BASOPHILS NFR BLD AUTO: 0.5 %
BILIRUB SERPL-MCNC: 0.7 MG/DL (ref 0.2–0.9)
BUN BLD-MCNC: 19 MG/DL (ref 9–23)
CALCIUM BLD-MCNC: 9.3 MG/DL (ref 8.7–10.6)
CHLORIDE SERPL-SCNC: 107 MMOL/L (ref 98–112)
CHOLEST SERPL-MCNC: 186 MG/DL (ref ?–200)
CO2 SERPL-SCNC: 28 MMOL/L (ref 21–32)
CREAT BLD-MCNC: 1.24 MG/DL (ref 0.7–1.3)
EGFRCR SERPLBLD CKD-EPI 2021: 55 ML/MIN/1.73M2 (ref 60–?)
EOSINOPHIL # BLD AUTO: 0.13 X10(3) UL (ref 0–0.7)
EOSINOPHIL NFR BLD AUTO: 2.3 %
ERYTHROCYTE [DISTWIDTH] IN BLOOD BY AUTOMATED COUNT: 14.4 %
FASTING PATIENT LIPID ANSWER: YES
FASTING STATUS PATIENT QL REPORTED: YES
FOLATE SERPL-MCNC: 34.1 NG/ML (ref 5.4–?)
GLOBULIN PLAS-MCNC: 2.5 G/DL (ref 2–3.5)
GLUCOSE BLD-MCNC: 114 MG/DL (ref 70–99)
HCT VFR BLD AUTO: 38.2 % (ref 39–53)
HDLC SERPL-MCNC: 51 MG/DL (ref 40–59)
HGB BLD-MCNC: 13.1 G/DL (ref 13–17.5)
IMM GRANULOCYTES # BLD AUTO: 0.01 X10(3) UL (ref 0–1)
IMM GRANULOCYTES NFR BLD: 0.2 %
IRON SATN MFR SERPL: 25 % (ref 20–50)
IRON SERPL-MCNC: 84 UG/DL (ref 65–175)
LDLC SERPL CALC-MCNC: 121 MG/DL (ref ?–100)
LYMPHOCYTES # BLD AUTO: 1.63 X10(3) UL (ref 1–4)
LYMPHOCYTES NFR BLD AUTO: 28.7 %
MCH RBC QN AUTO: 32.3 PG (ref 26–34)
MCHC RBC AUTO-ENTMCNC: 34.3 G/DL (ref 31–37)
MCV RBC AUTO: 94.3 FL (ref 80–100)
MONOCYTES # BLD AUTO: 0.49 X10(3) UL (ref 0.1–1)
MONOCYTES NFR BLD AUTO: 8.6 %
NEUTROPHILS # BLD AUTO: 3.38 X10 (3) UL (ref 1.5–7.7)
NEUTROPHILS # BLD AUTO: 3.38 X10(3) UL (ref 1.5–7.7)
NEUTROPHILS NFR BLD AUTO: 59.7 %
NONHDLC SERPL-MCNC: 135 MG/DL (ref ?–130)
OSMOLALITY SERPL CALC.SUM OF ELEC: 295 MOSM/KG (ref 275–295)
PLATELET # BLD AUTO: 286 10(3)UL (ref 150–450)
POTASSIUM SERPL-SCNC: 3.7 MMOL/L (ref 3.5–5.1)
PROT SERPL-MCNC: 6.8 G/DL (ref 5.7–8.2)
RBC # BLD AUTO: 4.05 X10(6)UL (ref 3.8–5.8)
SODIUM SERPL-SCNC: 141 MMOL/L (ref 136–145)
T4 FREE SERPL-MCNC: 1.3 NG/DL (ref 0.8–1.7)
TOTAL IRON BINDING CAPACITY: 335 UG/DL (ref 250–425)
TRANSFERRIN SERPL-MCNC: 262 MG/DL (ref 215–365)
TRIGL SERPL-MCNC: 76 MG/DL (ref 30–149)
TSI SER-ACNC: 4.81 UIU/ML (ref 0.55–4.78)
VIT B12 SERPL-MCNC: 506 PG/ML (ref 211–911)
VLDLC SERPL CALC-MCNC: 13 MG/DL (ref 0–30)
WBC # BLD AUTO: 5.7 X10(3) UL (ref 4–11)

## 2025-04-22 PROCEDURE — 84443 ASSAY THYROID STIM HORMONE: CPT

## 2025-04-22 PROCEDURE — 83550 IRON BINDING TEST: CPT

## 2025-04-22 PROCEDURE — 36415 COLL VENOUS BLD VENIPUNCTURE: CPT

## 2025-04-22 PROCEDURE — 83540 ASSAY OF IRON: CPT

## 2025-04-22 PROCEDURE — 82746 ASSAY OF FOLIC ACID SERUM: CPT

## 2025-04-22 PROCEDURE — 84439 ASSAY OF FREE THYROXINE: CPT

## 2025-04-22 PROCEDURE — 82607 VITAMIN B-12: CPT

## 2025-04-22 PROCEDURE — 85025 COMPLETE CBC W/AUTO DIFF WBC: CPT

## 2025-04-22 PROCEDURE — 80053 COMPREHEN METABOLIC PANEL: CPT

## 2025-04-22 PROCEDURE — 80061 LIPID PANEL: CPT

## 2025-05-14 ENCOUNTER — OFFICE VISIT (OUTPATIENT)
Dept: INTERNAL MEDICINE CLINIC | Facility: CLINIC | Age: OVER 89
End: 2025-05-14
Payer: MEDICARE

## 2025-05-14 VITALS
OXYGEN SATURATION: 98 % | DIASTOLIC BLOOD PRESSURE: 70 MMHG | WEIGHT: 205 LBS | SYSTOLIC BLOOD PRESSURE: 126 MMHG | BODY MASS INDEX: 29 KG/M2 | HEART RATE: 78 BPM | TEMPERATURE: 98 F

## 2025-05-14 DIAGNOSIS — Z51.81 MEDICATION MONITORING ENCOUNTER: ICD-10-CM

## 2025-05-14 DIAGNOSIS — I10 BENIGN ESSENTIAL HYPERTENSION: ICD-10-CM

## 2025-05-14 DIAGNOSIS — R79.89 ABNORMAL TSH: ICD-10-CM

## 2025-05-14 DIAGNOSIS — E03.8 SUBCLINICAL HYPOTHYROIDISM: ICD-10-CM

## 2025-05-14 DIAGNOSIS — Z12.5 PROSTATE CANCER SCREENING: ICD-10-CM

## 2025-05-14 DIAGNOSIS — E78.5 HYPERLIPIDEMIA, UNSPECIFIED HYPERLIPIDEMIA TYPE: ICD-10-CM

## 2025-05-14 DIAGNOSIS — K59.00 CONSTIPATION, UNSPECIFIED CONSTIPATION TYPE: Primary | ICD-10-CM

## 2025-05-14 DIAGNOSIS — Z86.2 HISTORY OF ANEMIA: ICD-10-CM

## 2025-05-14 PROCEDURE — G2211 COMPLEX E/M VISIT ADD ON: HCPCS | Performed by: INTERNAL MEDICINE

## 2025-05-14 PROCEDURE — 99214 OFFICE O/P EST MOD 30 MIN: CPT | Performed by: INTERNAL MEDICINE

## 2025-05-14 RX ORDER — LACTULOSE 10 G/15ML
20 SOLUTION ORAL DAILY PRN
Qty: 2746.45 ML | Refills: 0 | Status: SHIPPED | OUTPATIENT
Start: 2025-05-14

## 2025-05-14 RX ORDER — AMIODARONE HYDROCHLORIDE 200 MG/1
200 TABLET ORAL 2 TIMES DAILY
COMMUNITY
Start: 2025-01-29

## 2025-05-14 NOTE — PROGRESS NOTES
Subjective:   Bernardino Palacios is a 89 year old male  who presents for Follow - Up       The following individual(s) verbally consented to be recorded using ambient AI listening technology and understand that they can each withdraw their consent to this listening technology at any point by asking the clinician to turn off or pause the recording:    Patient name: Bernardino Palacios  Additional names:  Perla Palacios         History of Present Illness  Bernardino Palacios is a 89 year old male with atrial fibrillation who presents for follow-up after recent cardioversion and ongoing constipation issues. He is accompanied by his wife.    Atrial fibrillation is managed with amiodarone following hospitalization in March and cardioversion. Another cardioversion is scheduled for May 19 if amiodarone does not stabilize the heart rate.    Constipation still present Miralax, and colace. Sometimes needs laxative. Dulcolax provides some relief. There is no abdominal pain, nausea, vomiting, or rectal bleeding.    A fall in February resulted in a hairline fracture of the right proximal humerus shaft, treated with a sling. No ongoing pain or mobility issues beyond baseline arthritis-related limitations.    Iron supplements are taken daily, improving anemia and iron levels.       History/Other:    Chief Complaint Reviewed and Verified  No Further Nursing Notes to   Review  Tobacco Reviewed  Allergies Reviewed         Current Medications[1]    Review of Systems:  Pertinent items are noted in HPI.  A comprehensive 10 point review of systems was completed.  Pertinent positives and negatives noted in the the HPI.        Objective:   /70 (BP Location: Right arm, Patient Position: Sitting, Cuff Size: large)   Pulse 78   Temp 98 °F (36.7 °C) (Temporal)   Wt 205 lb (93 kg)   SpO2 98%   BMI 29.41 kg/m²  Estimated body mass index is 29.41 kg/m² as calculated from the following:    Height as of 3/11/25: 5' 10\" (1.778 m).    Weight as of this  encounter: 205 lb (93 kg).  PHYSICAL EXAM:   General: no acute distress   Respiratory: no increased work of breathing; good air exchange; CTAB; no crackles or wheezing   Cardiovascular: initially RRR but then irregularly irregular; no acute symptoms. Known afib. no edema   Neurological: awake, alert, oriented x3; CNII-XII grossly intact;  MSK: stiffness in R shoulder (baseline /chronic) strength intact. Uses cane with ambulation   Behavioral/Psych: euthymic; appropriate affect   Physical Exam      Assessment & Plan:       Assessment & Plan  Atrial fibrillation  Atrial fibrillation post-cardioversion in March. Scheduled for cardioversion on May 19 pending cardiology recs. .  -as per cardiology     Constipation  Chronic constipation  Miralax and Colace ineffective.   - Discontinue Miralax and Colace if ineffective.  - Initiate lactulose 30 mL daily as needed  - Continue fiber intake.  - close monitoring. Discussed seeing GI if no improvement       Iron deficiency anemia resolved. Current iron supplementation may contribute to constipation.  - Reduce iron supplementation to every other day.  - Recheck labs in six months to monitor cbc, iron levels, and thyroid function.    Abnormal TSH- likely due to amiodarone- continue to monitor closely.     General Health Maintenance  Up to date with pneumonia vaccines. Shingles vaccine recommended  - Recommend shingles vaccine at pharmacy,         This note was created utilizing [facility] speech recognition software which may lead to grammatical errors/typos.   If any word or phrase is confusing, it is likely due to recognition error. Please ask the provider for clarification.      Joselyn Shepherd MD       [1]   Current Outpatient Medications   Medication Sig Dispense Refill    Ferrous Sulfate ER (SLOW FE) 45 MG Oral Tab CR Take 1 tablet by mouth daily.      amiodarone 200 MG Oral Tab Take 1 tablet (200 mg total) by mouth daily.      Polyethylene Glycol 3350 (MIRALAX OR) Take by  mouth daily as needed.      docusate sodium (COLACE) 100 MG Oral Cap Take 1 capsule (100 mg total) by mouth 2 (two) times daily. 180 capsule 0    finasteride 5 MG Oral Tab Take 1 tablet (5 mg total) by mouth daily. 90 tablet 3    NON FORMULARY Take by mouth as needed. Mildred Mucil PRN      tamsulosin 0.4 MG Oral Cap       metoprolol succinate ER 50 MG Oral Tablet 24 Hr Take 1 tablet (50 mg total) by mouth daily.      latanoprost 0.005 % Ophthalmic Solution Place 1 drop into both eyes nightly.      lisinopril 5 MG Oral Tab Take 1 tablet (5 mg total) by mouth daily.      apixaban 5 MG Oral Tab Take 1 tablet (5 mg total) by mouth 2 (two) times daily.      furosemide 20 MG Oral Tab Take 1 tablet (20 mg total) by mouth daily.      Brimonidine Tartrate (ALPHAGAN) 0.15 % Ophthalmic Solution Place 1 drop into both eyes 2 (two) times daily.  3    Dorzolamide HCl-Timolol Mal (DORZOLAMIDE-TIMOLOL) 22.3-6.8 MG/ML Ophthalmic Solution 1 drop 2 (two) times daily.      Multiple Vitamins-Minerals (MULTI-VITAMIN/MINERALS) Oral Tab Take 1 tablet by mouth daily.

## 2025-05-16 NOTE — PAT NURSING NOTE
Patient denies missing any doses of Eliquis in last 30 days    PreOp Instructions     You are scheduled for: a Cardiac Procedure     Date of Procedure: 05/19/25 Monday     Diet Instructions: Do not eat or drink anything after midnight including gum, mints, candy, etc.     Medications: Medications you are allowed to take can be taken with a sip of water the morning of your procedure     Medications to Stop: Hold herbal supplements and vitamins morning of procedure     Other Medications: Morning of procedure, HOLD/do not take Lasix/Furosemide     Arrival Time: The day prior to your procedure you will receive a phone call before 6:00 pm with your arrival time. If you haven't received a phone call, please check your voicemail messages., If you did not receive a voice mail and it is after 6:00 pm, please call the nursing supervisor at [phone].    Driving After Procedure: Sedation will be given so you WILL NOT be able to drive home. You will need a responsible adult  to drive you home. You can NOT take uber or taxi unless approved by your physician in advance.     Discharge Teaching: Your nurse will give you specific instructions before discharge, Most people can resume normal activities in 2-3 days, Any questions, please call the physician's office     Discharge Teaching (Overnight): We recommend you bring an overnight bag that you leave in the car the day of your procedure in case you need to stay overnight., We do not have lockers to lock up belongings so your  would have to hold onto bag while you are in the procedure., Example would be chargers for electronics you need, toiletries, change of clothes, etc.     [employer] parking is available starting at 6 am or park in the [place] parking garage at [facility]. Check in at the [facility] reception desk. Our  will be there to check you in for your procedure. Please bring your insurance cards and ID with you.                                                                                                                                       Please DO NOT respond to this message, the inbasket is not monitored for messages. For any questions, please call the physician's office.

## 2025-05-19 ENCOUNTER — HOSPITAL ENCOUNTER (OUTPATIENT)
Dept: INTERVENTIONAL RADIOLOGY/VASCULAR | Facility: HOSPITAL | Age: OVER 89
Discharge: HOME | End: 2025-05-19
Attending: INTERNAL MEDICINE | Admitting: INTERNAL MEDICINE
Payer: MEDICARE

## 2025-05-19 VITALS
WEIGHT: 195 LBS | SYSTOLIC BLOOD PRESSURE: 153 MMHG | BODY MASS INDEX: 27.92 KG/M2 | HEIGHT: 70 IN | RESPIRATION RATE: 17 BRPM | TEMPERATURE: 97 F | HEART RATE: 60 BPM | DIASTOLIC BLOOD PRESSURE: 80 MMHG | OXYGEN SATURATION: 98 %

## 2025-05-19 DIAGNOSIS — I48.91 A-FIB (HCC): ICD-10-CM

## 2025-05-19 LAB
ATRIAL RATE: 62 BPM
P AXIS: 67 DEGREES
P-R INTERVAL: 302 MS
Q-T INTERVAL: 508 MS
QRS DURATION: 150 MS
QTC CALCULATION (BEZET): 515 MS
R AXIS: -56 DEGREES
T AXIS: 50 DEGREES
VENTRICULAR RATE: 62 BPM

## 2025-05-19 PROCEDURE — 93005 ELECTROCARDIOGRAM TRACING: CPT

## 2025-05-19 PROCEDURE — 92960 CARDIOVERSION ELECTRIC EXT: CPT | Performed by: INTERNAL MEDICINE

## 2025-05-19 PROCEDURE — 93010 ELECTROCARDIOGRAM REPORT: CPT | Performed by: INTERNAL MEDICINE

## 2025-05-19 RX ORDER — METHOHEXITAL IN WATER/PF 100MG/10ML
100 SYRINGE (ML) INTRAVENOUS ONCE
Status: COMPLETED | OUTPATIENT
Start: 2025-05-19 | End: 2025-05-19

## 2025-05-19 RX ORDER — SODIUM CHLORIDE 9 MG/ML
INJECTION, SOLUTION INTRAVENOUS CONTINUOUS
Status: DISCONTINUED | OUTPATIENT
Start: 2025-05-19 | End: 2025-05-19

## 2025-05-19 RX ORDER — METHOHEXITAL IN WATER/PF 100MG/10ML
SYRINGE (ML) INTRAVENOUS
Status: COMPLETED
Start: 2025-05-19 | End: 2025-05-19

## 2025-05-19 RX ORDER — METHOHEXITAL IN WATER/PF 100MG/10ML
SYRINGE (ML) INTRAVENOUS
Status: DISCONTINUED
Start: 2025-05-19 | End: 2025-05-19 | Stop reason: WASHOUT

## 2025-05-19 RX ADMIN — SODIUM CHLORIDE: 9 INJECTION, SOLUTION INTRAVENOUS at 12:45:00

## 2025-05-19 RX ADMIN — METHOHEXITAL IN WATER/PF 40 MG: 100MG/10ML SYRINGE (ML) INTRAVENOUS at 14:09:00

## 2025-05-19 NOTE — PROGRESS NOTES
Pt s/p successful synchronized cardioversion. Post procedure EKG done. VSS. Neurologically intact. Denied pain. Tolerated PO intake. IV d/c'd. Discharge instructions given-pt verbalized understanding. Pt to lobby via WC and grand daughter to drive home.

## 2025-05-19 NOTE — H&P
[place]  Pre Procedure History and Physical    Bernardino Palacios Patient Status:  Outpatient    1934 MRN [id number]   [facility] INTERVENTIONAL SUITES Attending Aura Logan MD   Hosp Day # 0 PCP Joselyn Shepherd MD     Consults      History of Present Illness:  Bernardino Palacios is a a(n) 89 year old male here for DCCV      Prior H/P Reviewed with no changes    History:  Past Medical History[1]  Past Surgical History[2]  Family History[3]   reports that he quit smoking about 52 years ago. His smoking use included cigarettes. He has a 30 pack-year smoking history. He has never used smokeless tobacco. He reports current alcohol use. He reports that he does not use drugs.    Allergies:  Allergies[4]    Medications:  Current Hospital Medications[5]    OBJECTIVE      Physical Exam:  Physical Exam   Height 5' 10\" (1.778 m), weight 195 lb (88.5 kg).  No data recorded.    Wt Readings from Last 3 Encounters:   25 195 lb (88.5 kg)   25 205 lb (93 kg)   25 198 lb (89.8 kg)       NAD  PERRLA/EOMI  Neck veins not elevated  Carotids- no bruits  CTA bilaterally  Cardiac- irreg irreg  Abdomen- Soft,Nontender, normal BS  Extremities- pulses normal  Edema- none  Mood /Affect Congruent  Skin- no lesions          Results:   No results for input(s): \"GLU\", \"BUN\", \"CREATSERUM\", \"GFRAA\", \"GFRNAA\", \"EGFRCR\", \"CA\", \"NA\", \"K\", \"CL\", \"CO2\" in the last 168 hours.  No results for input(s): \"RBC\", \"HGB\", \"HCT\", \"MCV\", \"MCH\", \"MCHC\", \"RDW\", \"NEPRELIM\", \"WBC\", \"PLT\" in the last 168 hours.      [unfilled]  No results for input(s): \"BNP\" in the last 168 hours.  No results found for: \"PT\", \"INR\"  No results found for: \"TROP\"      No results found.       Assessment and Plan    Problem List[6]    Consent: Risks, Benefits and alternatives discussed in clinic. Reverified on the day of the procedure    Procedure Planned: DCCV    Sedation Plan: Brevital    Discharge Plan: Same day      Aura Logan  MD  Cardiac Electrophysiology  [facility]  2025  7:54 AM         [1]   Past Medical History:   Acute, but ill-defined, cerebrovascular disease    Anemia    Arrhythmia    Arthritis    Atherosclerosis of coronary artery    Atypical mole    Back pain    Lower back    Bad breath    Belching    Black stools    When taking iron supplements    Cancer (HCC)    Basal cell carcinoma    Cardiomyopathy (HCC)    Change in hair    Chronic cough    Constipation    Past 6 months    Deep vein thrombosis (HCC)    Disorder of prostate    Easy bruising    Taking Eliquis    Elevated blood pressure    Esophageal reflux    Occasional    Frequent urination    Frequent use of laxatives    Glaucoma    H/O blood clots    Hearing loss    Heart palpitations    Heartburn    Occasional    Hemorrhoids    High blood pressure    High cholesterol    Irregular bowel habits    Past 6 months    Jaundice    Pacemaker    Pain in joints    Pain with bowel movements    Occasional    Presence of other cardiac implants and grafts    Cataract left eye removed    Sleep disturbance    Thyroid disease    Amiodarone    Wears glasses   [2]   Past Surgical History:  Procedure Laterality Date    Adenoidectomy      Early childhood    Cardiac pacemaker placement  2022    Cataract Left     Colonoscopy      Other surgical history  Multiple    Remove many lipomas    Sigmoidoscopy,diagnostic      Skin surgery   plus    Tonsillectomy  Child   [3]   Family History  Problem Relation Age of Onset    Heart Disorder Mother          Pancreatitis after open heart surgery    Pulmonary Disease Father         Congestive Heart Failure    Cancer Maternal Grandfather     Heart Disorder Son         Heart Attack    [4] No Known Allergies  [5] No current facility-administered medications for this encounter.  [6]   Patient Active Problem List  Diagnosis    History of DVT (deep vein thrombosis)    Abnormal vision    Atrial fibrillation  status post cardioversion (HCC)    Benign essential hypertension    Benign prostatic hyperplasia without urinary obstruction    Bilateral carotid artery stenosis    Combined systolic and diastolic cardiac dysfunction    Constipation    Dilatation of thoracic aorta    Dilated cardiomyopathy (HCC)    Familial hypercholesterolemia    Glaucoma    Paraseptal emphysema (HCC)    Encounter for monitoring amiodarone therapy    Subclinical hypothyroidism    Dyslipidemia    Edema, peripheral    Fatigue    Hypertension    Pulmonary nodules    Transient ischemic attack

## 2025-05-19 NOTE — PROCEDURES
Electrophysiology Procedure Note    Bernardino Palacios Location: Cath Lab   CSN [id number] MRN [id number]   Admission Date (Not on file)  Operation Date 05/19/25    Attending Physician Aura Logan MD Operating Physician Aura Logan MD     Pre-Operative Diagnosis:Atrial Fibrillation    Post-Operative Diagnosis: Same as above  PROCEDURE(S) PERFORMED:    1.    Cardioversion.  2.     Sedation      :  Aura Logan MD     ANESTHESIA:  IV sedation.  Moderate conscious sedation for this procedure was administered and personally monitored. Brevital 40 mg  Sedation time: 10 minutes     INDICATION:  Persistent Atrial Fibrillation     COMPLICATIONS:  None.     METHODS:  The patient was brought to the outpatient cardiac telemetry unit in a fasting and nonsedated state after providing informed consent.  IV sedation was administered during continuous ECG, pulse oximeter and noninvasive hemodynamic monitoring.  After administering IV Brevital in intermittent boluses, the desired level of sedation was achieved.      Cardioversion Energy:  200J    Pad Position: [place]  Pre- Cardioversion Rhythm- Af  Post Cardioversion Rhythm - AP/V paced    Evice cehck post- normal    CONCLUSIONS:  1.    Successful cardioversion       Plan:  1- Rhythm/Rate Control Meds:  no changes  2) Anticoagulation- no changes  3) Follow Up- 1 month                    Aura Logan MD     Cardiac Electrophysiololgy  [facility]

## (undated) NOTE — LETTER
[facility] [address], [address]  Consent for Procedure/Sedation    Date:     Time:       1. I authorize the performance upon Nicole Gimenez the following:  Cardioversion.      2. I authorize Dr. Curt Chaney (and whomever is designated Witness: _________________________      Date: ___________________    Printed: 2019   4:04 PM  Patient Name: Alysa Fagan        : 1934       Medical Record #: [id number]